# Patient Record
Sex: FEMALE | Race: WHITE | NOT HISPANIC OR LATINO | Employment: OTHER | URBAN - METROPOLITAN AREA
[De-identification: names, ages, dates, MRNs, and addresses within clinical notes are randomized per-mention and may not be internally consistent; named-entity substitution may affect disease eponyms.]

---

## 2017-01-03 PROCEDURE — 82043 UR ALBUMIN QUANTITATIVE: CPT | Performed by: INTERNAL MEDICINE

## 2017-01-03 PROCEDURE — 82570 ASSAY OF URINE CREATININE: CPT | Performed by: INTERNAL MEDICINE

## 2017-01-04 PROBLEM — I70.0 ATHEROSCLEROSIS OF AORTA (HCC): Status: ACTIVE | Noted: 2017-01-04

## 2017-01-04 PROBLEM — E66.01 MORBID OBESITY, UNSPECIFIED OBESITY TYPE (HCC): Status: ACTIVE | Noted: 2017-01-04

## 2017-01-11 PROBLEM — I10 HYPERTENSION, ESSENTIAL, BENIGN: Status: ACTIVE | Noted: 2017-01-11

## 2017-01-11 PROBLEM — E11.3393 UNCONTROLLED TYPE 2 DIABETES MELLITUS WITH BOTH EYES AFFECTED BY MODERATE NONPROLIFERATIVE RETINOPATHY WITHOUT MACULAR EDEMA, WITH LONG-TERM CURRENT USE OF INSULIN (HCC): Status: ACTIVE | Noted: 2017-01-11

## 2017-01-11 PROBLEM — Z79.4 UNCONTROLLED TYPE 2 DIABETES MELLITUS WITH BOTH EYES AFFECTED BY MODERATE NONPROLIFERATIVE RETINOPATHY WITHOUT MACULAR EDEMA, WITH LONG-TERM CURRENT USE OF INSULIN (HCC): Status: ACTIVE | Noted: 2017-01-11

## 2017-01-11 PROBLEM — IMO0002 UNCONTROLLED TYPE 2 DIABETES MELLITUS WITH BOTH EYES AFFECTED BY MODERATE NONPROLIFERATIVE RETINOPATHY WITHOUT MACULAR EDEMA, WITH LONG-TERM CURRENT USE OF INSULIN: Status: ACTIVE | Noted: 2017-01-11

## 2017-01-11 PROBLEM — E66.01 MORBID OBESITY, UNSPECIFIED OBESITY TYPE (HCC): Status: RESOLVED | Noted: 2017-01-04 | Resolved: 2017-01-11

## 2017-01-11 PROBLEM — E11.65 UNCONTROLLED TYPE 2 DIABETES MELLITUS WITH BOTH EYES AFFECTED BY MODERATE NONPROLIFERATIVE RETINOPATHY WITHOUT MACULAR EDEMA, WITH LONG-TERM CURRENT USE OF INSULIN (HCC): Status: ACTIVE | Noted: 2017-01-11

## 2017-03-23 PROBLEM — I25.10 ATHEROSCLEROSIS OF NATIVE CORONARY ARTERY OF NATIVE HEART WITHOUT ANGINA PECTORIS: Status: ACTIVE | Noted: 2017-03-23

## 2017-08-20 PROBLEM — E11.65 UNCONTROLLED TYPE 2 DIABETES MELLITUS WITH HYPERGLYCEMIA, WITH LONG-TERM CURRENT USE OF INSULIN (HCC): Status: ACTIVE | Noted: 2017-08-20

## 2017-08-20 PROBLEM — E78.5 DYSLIPIDEMIA: Status: ACTIVE | Noted: 2017-08-20

## 2017-08-20 PROBLEM — Z79.4 UNCONTROLLED TYPE 2 DIABETES MELLITUS WITH HYPERGLYCEMIA, WITH LONG-TERM CURRENT USE OF INSULIN (HCC): Status: ACTIVE | Noted: 2017-08-20

## 2017-11-08 PROBLEM — E78.5 DYSLIPIDEMIA: Status: RESOLVED | Noted: 2017-08-20 | Resolved: 2017-11-08

## 2017-12-06 PROBLEM — IMO0002 UNCONTROLLED TYPE 2 DIABETES MELLITUS WITH DIABETIC PERIPHERAL ANGIOPATHY WITHOUT GANGRENE, WITH LONG-TERM CURRENT USE OF INSULIN: Status: ACTIVE | Noted: 2017-12-06

## 2017-12-06 PROBLEM — Z79.4 UNCONTROLLED TYPE 2 DIABETES MELLITUS WITH DIABETIC PERIPHERAL ANGIOPATHY WITHOUT GANGRENE, WITH LONG-TERM CURRENT USE OF INSULIN (HCC): Status: ACTIVE | Noted: 2017-12-06

## 2017-12-06 PROBLEM — E11.51 UNCONTROLLED TYPE 2 DIABETES MELLITUS WITH DIABETIC PERIPHERAL ANGIOPATHY WITHOUT GANGRENE, WITH LONG-TERM CURRENT USE OF INSULIN (HCC): Status: ACTIVE | Noted: 2017-12-06

## 2017-12-06 PROBLEM — E11.65 UNCONTROLLED TYPE 2 DIABETES MELLITUS WITH DIABETIC PERIPHERAL ANGIOPATHY WITHOUT GANGRENE, WITH LONG-TERM CURRENT USE OF INSULIN (HCC): Status: ACTIVE | Noted: 2017-12-06

## 2018-04-04 PROBLEM — IMO0002 UNCONTROLLED TYPE 2 DIABETES MELLITUS WITH OTHER CIRCULATORY COMPLICATION, WITH LONG-TERM CURRENT USE OF INSULIN: Status: ACTIVE | Noted: 2018-04-04

## 2018-04-04 PROBLEM — E55.9 VITAMIN D DEFICIENCY: Status: ACTIVE | Noted: 2018-04-04

## 2018-04-04 PROBLEM — E11.65 UNCONTROLLED TYPE 2 DIABETES MELLITUS WITH OTHER CIRCULATORY COMPLICATION, WITH LONG-TERM CURRENT USE OF INSULIN: Status: ACTIVE | Noted: 2018-04-04

## 2018-04-04 PROBLEM — Z79.4 UNCONTROLLED TYPE 2 DIABETES MELLITUS WITH OTHER CIRCULATORY COMPLICATION, WITH LONG-TERM CURRENT USE OF INSULIN: Status: ACTIVE | Noted: 2018-04-04

## 2018-04-04 PROBLEM — E11.59 UNCONTROLLED TYPE 2 DIABETES MELLITUS WITH OTHER CIRCULATORY COMPLICATION, WITH LONG-TERM CURRENT USE OF INSULIN: Status: ACTIVE | Noted: 2018-04-04

## 2018-05-14 PROBLEM — M17.0 PRIMARY OSTEOARTHRITIS OF BOTH KNEES: Status: ACTIVE | Noted: 2018-05-14

## 2018-05-23 PROBLEM — R91.8 PULMONARY NODULES: Status: ACTIVE | Noted: 2018-05-23

## 2018-06-06 PROCEDURE — 82607 VITAMIN B-12: CPT | Performed by: NURSE PRACTITIONER

## 2018-06-06 PROCEDURE — 82397 CHEMILUMINESCENT ASSAY: CPT | Performed by: NURSE PRACTITIONER

## 2018-06-06 PROCEDURE — 82043 UR ALBUMIN QUANTITATIVE: CPT | Performed by: INTERNAL MEDICINE

## 2018-06-06 PROCEDURE — 82570 ASSAY OF URINE CREATININE: CPT | Performed by: INTERNAL MEDICINE

## 2018-06-14 PROCEDURE — 87077 CULTURE AEROBIC IDENTIFY: CPT | Performed by: INTERNAL MEDICINE

## 2018-06-14 PROCEDURE — 87205 SMEAR GRAM STAIN: CPT | Performed by: INTERNAL MEDICINE

## 2018-06-14 PROCEDURE — 87070 CULTURE OTHR SPECIMN AEROBIC: CPT | Performed by: INTERNAL MEDICINE

## 2018-07-30 PROBLEM — IMO0002 UNCONTROLLED TYPE 2 DIABETES MELLITUS WITH OTHER CIRCULATORY COMPLICATION, WITH LONG-TERM CURRENT USE OF INSULIN: Status: RESOLVED | Noted: 2018-04-04 | Resolved: 2018-07-30

## 2018-07-30 PROBLEM — IMO0002 UNCONTROLLED TYPE 2 DIABETES MELLITUS WITH DIABETIC PERIPHERAL ANGIOPATHY WITHOUT GANGRENE, WITH LONG-TERM CURRENT USE OF INSULIN: Status: RESOLVED | Noted: 2017-12-06 | Resolved: 2018-07-30

## 2018-07-30 PROBLEM — E11.51 UNCONTROLLED TYPE 2 DIABETES MELLITUS WITH DIABETIC PERIPHERAL ANGIOPATHY WITHOUT GANGRENE, WITH LONG-TERM CURRENT USE OF INSULIN (HCC): Status: RESOLVED | Noted: 2017-12-06 | Resolved: 2018-07-30

## 2018-07-30 PROBLEM — Z79.4 UNCONTROLLED TYPE 2 DIABETES MELLITUS WITH DIABETIC PERIPHERAL ANGIOPATHY WITHOUT GANGRENE, WITH LONG-TERM CURRENT USE OF INSULIN (HCC): Status: RESOLVED | Noted: 2017-12-06 | Resolved: 2018-07-30

## 2018-07-30 PROBLEM — E11.65 UNCONTROLLED TYPE 2 DIABETES MELLITUS WITH OTHER CIRCULATORY COMPLICATION, WITH LONG-TERM CURRENT USE OF INSULIN: Status: RESOLVED | Noted: 2018-04-04 | Resolved: 2018-07-30

## 2018-07-30 PROBLEM — Z79.4 UNCONTROLLED TYPE 2 DIABETES MELLITUS WITH OTHER CIRCULATORY COMPLICATION, WITH LONG-TERM CURRENT USE OF INSULIN: Status: RESOLVED | Noted: 2018-04-04 | Resolved: 2018-07-30

## 2018-07-30 PROBLEM — E11.59 UNCONTROLLED TYPE 2 DIABETES MELLITUS WITH OTHER CIRCULATORY COMPLICATION, WITH LONG-TERM CURRENT USE OF INSULIN: Status: RESOLVED | Noted: 2018-04-04 | Resolved: 2018-07-30

## 2018-07-30 PROBLEM — E11.65 UNCONTROLLED TYPE 2 DIABETES MELLITUS WITH DIABETIC PERIPHERAL ANGIOPATHY WITHOUT GANGRENE, WITH LONG-TERM CURRENT USE OF INSULIN (HCC): Status: RESOLVED | Noted: 2017-12-06 | Resolved: 2018-07-30

## 2018-08-08 PROCEDURE — 88305 TISSUE EXAM BY PATHOLOGIST: CPT | Performed by: OBSTETRICS & GYNECOLOGY

## 2018-12-31 PROBLEM — E78.1 HYPERTRIGLYCERIDEMIA: Status: ACTIVE | Noted: 2018-12-31

## 2018-12-31 PROBLEM — E11.59 UNCONTROLLED TYPE 2 DIABETES MELLITUS WITH CIRCULATORY DISORDER, WITH LONG-TERM CURRENT USE OF INSULIN (HCC): Status: ACTIVE | Noted: 2018-12-31

## 2018-12-31 PROBLEM — E11.65 UNCONTROLLED TYPE 2 DIABETES MELLITUS WITH CIRCULATORY DISORDER, WITH LONG-TERM CURRENT USE OF INSULIN (HCC): Status: ACTIVE | Noted: 2018-12-31

## 2018-12-31 PROBLEM — IMO0002 UNCONTROLLED TYPE 2 DIABETES MELLITUS WITH CIRCULATORY DISORDER, WITH LONG-TERM CURRENT USE OF INSULIN: Status: ACTIVE | Noted: 2018-12-31

## 2018-12-31 PROBLEM — Z79.4 UNCONTROLLED TYPE 2 DIABETES MELLITUS WITH CIRCULATORY DISORDER, WITH LONG-TERM CURRENT USE OF INSULIN (HCC): Status: ACTIVE | Noted: 2018-12-31

## 2019-10-02 PROBLEM — I65.23 BILATERAL CAROTID ARTERY STENOSIS: Status: ACTIVE | Noted: 2019-10-02

## 2019-10-14 PROBLEM — E78.1 HYPERTRIGLYCERIDEMIA: Status: RESOLVED | Noted: 2018-12-31 | Resolved: 2019-10-14

## 2019-10-23 PROBLEM — Z95.5 S/P RIGHT CORONARY ARTERY (RCA) STENT PLACEMENT: Status: ACTIVE | Noted: 2019-10-23

## 2019-10-23 PROBLEM — R93.1 AGATSTON CORONARY ARTERY CALCIUM SCORE GREATER THAN 400: Status: ACTIVE | Noted: 2019-10-23

## 2019-10-23 PROBLEM — Z78.9 STATIN INTOLERANCE: Status: ACTIVE | Noted: 2019-10-23

## 2020-05-04 PROBLEM — I51.5 CARDIAC CALCIFICATION (HCC): Status: ACTIVE | Noted: 2020-05-04

## 2020-10-19 PROBLEM — E11.65 UNCONTROLLED TYPE 2 DIABETES MELLITUS WITH DIABETIC PERIPHERAL ANGIOPATHY WITHOUT GANGRENE, WITH LONG-TERM CURRENT USE OF INSULIN (HCC): Status: RESOLVED | Noted: 2017-12-06 | Resolved: 2020-10-19

## 2020-10-19 PROBLEM — Z79.4 CONTROLLED TYPE 2 DIABETES MELLITUS WITH DIABETIC PERIPHERAL ANGIOPATHY WITHOUT GANGRENE, WITH LONG-TERM CURRENT USE OF INSULIN (HCC): Status: ACTIVE | Noted: 2020-10-19

## 2020-10-19 PROBLEM — Z79.4 UNCONTROLLED TYPE 2 DIABETES MELLITUS WITH DIABETIC PERIPHERAL ANGIOPATHY WITHOUT GANGRENE, WITH LONG-TERM CURRENT USE OF INSULIN (HCC): Status: RESOLVED | Noted: 2017-12-06 | Resolved: 2020-10-19

## 2020-10-19 PROBLEM — Z79.4 UNCONTROLLED TYPE 2 DIABETES MELLITUS WITH CIRCULATORY DISORDER, WITH LONG-TERM CURRENT USE OF INSULIN (HCC): Status: RESOLVED | Noted: 2018-12-31 | Resolved: 2020-10-19

## 2020-10-19 PROBLEM — E11.59 CONTROLLED TYPE 2 DIABETES MELLITUS WITH OTHER CIRCULATORY COMPLICATION, WITH LONG-TERM CURRENT USE OF INSULIN (HCC): Status: ACTIVE | Noted: 2020-10-19

## 2020-10-19 PROBLEM — Z79.4 UNCONTROLLED TYPE 2 DIABETES MELLITUS WITH HYPERGLYCEMIA, WITH LONG-TERM CURRENT USE OF INSULIN (HCC): Status: RESOLVED | Noted: 2017-08-20 | Resolved: 2020-10-19

## 2020-10-19 PROBLEM — Z79.4 CONTROLLED TYPE 2 DIABETES MELLITUS WITH OTHER CIRCULATORY COMPLICATION, WITH LONG-TERM CURRENT USE OF INSULIN (HCC): Status: ACTIVE | Noted: 2020-10-19

## 2020-10-19 PROBLEM — E11.59 UNCONTROLLED TYPE 2 DIABETES MELLITUS WITH CIRCULATORY DISORDER, WITH LONG-TERM CURRENT USE OF INSULIN (HCC): Status: RESOLVED | Noted: 2018-12-31 | Resolved: 2020-10-19

## 2020-10-19 PROBLEM — IMO0002 UNCONTROLLED TYPE 2 DIABETES MELLITUS WITH BOTH EYES AFFECTED BY MODERATE NONPROLIFERATIVE RETINOPATHY WITHOUT MACULAR EDEMA, WITH LONG-TERM CURRENT USE OF INSULIN: Status: RESOLVED | Noted: 2017-01-11 | Resolved: 2020-10-19

## 2020-10-19 PROBLEM — IMO0002 UNCONTROLLED TYPE 2 DIABETES MELLITUS WITH DIABETIC PERIPHERAL ANGIOPATHY WITHOUT GANGRENE, WITH LONG-TERM CURRENT USE OF INSULIN: Status: RESOLVED | Noted: 2017-12-06 | Resolved: 2020-10-19

## 2020-10-19 PROBLEM — E11.65 UNCONTROLLED TYPE 2 DIABETES MELLITUS WITH HYPERGLYCEMIA, WITH LONG-TERM CURRENT USE OF INSULIN (HCC): Status: RESOLVED | Noted: 2017-08-20 | Resolved: 2020-10-19

## 2020-10-19 PROBLEM — E11.51 CONTROLLED TYPE 2 DIABETES MELLITUS WITH DIABETIC PERIPHERAL ANGIOPATHY WITHOUT GANGRENE, WITH LONG-TERM CURRENT USE OF INSULIN (HCC): Status: ACTIVE | Noted: 2020-10-19

## 2020-10-19 PROBLEM — Z79.4 CONTROLLED TYPE 2 DIABETES MELLITUS WITH BOTH EYES AFFECTED BY MODERATE NONPROLIFERATIVE RETINOPATHY WITHOUT MACULAR EDEMA, WITH LONG-TERM CURRENT USE OF INSULIN (HCC): Status: ACTIVE | Noted: 2020-10-19

## 2020-10-19 PROBLEM — IMO0002 UNCONTROLLED TYPE 2 DIABETES MELLITUS WITH CIRCULATORY DISORDER, WITH LONG-TERM CURRENT USE OF INSULIN: Status: RESOLVED | Noted: 2018-12-31 | Resolved: 2020-10-19

## 2020-10-19 PROBLEM — E11.3393 UNCONTROLLED TYPE 2 DIABETES MELLITUS WITH BOTH EYES AFFECTED BY MODERATE NONPROLIFERATIVE RETINOPATHY WITHOUT MACULAR EDEMA, WITH LONG-TERM CURRENT USE OF INSULIN (HCC): Status: RESOLVED | Noted: 2017-01-11 | Resolved: 2020-10-19

## 2020-10-19 PROBLEM — E11.65 UNCONTROLLED TYPE 2 DIABETES MELLITUS WITH CIRCULATORY DISORDER, WITH LONG-TERM CURRENT USE OF INSULIN (HCC): Status: RESOLVED | Noted: 2018-12-31 | Resolved: 2020-10-19

## 2020-10-19 PROBLEM — E11.3393 CONTROLLED TYPE 2 DIABETES MELLITUS WITH BOTH EYES AFFECTED BY MODERATE NONPROLIFERATIVE RETINOPATHY WITHOUT MACULAR EDEMA, WITH LONG-TERM CURRENT USE OF INSULIN (HCC): Status: ACTIVE | Noted: 2020-10-19

## 2020-10-19 PROBLEM — E11.65 UNCONTROLLED TYPE 2 DIABETES MELLITUS WITH BOTH EYES AFFECTED BY MODERATE NONPROLIFERATIVE RETINOPATHY WITHOUT MACULAR EDEMA, WITH LONG-TERM CURRENT USE OF INSULIN (HCC): Status: RESOLVED | Noted: 2017-01-11 | Resolved: 2020-10-19

## 2020-10-19 PROBLEM — Z79.4 UNCONTROLLED TYPE 2 DIABETES MELLITUS WITH BOTH EYES AFFECTED BY MODERATE NONPROLIFERATIVE RETINOPATHY WITHOUT MACULAR EDEMA, WITH LONG-TERM CURRENT USE OF INSULIN (HCC): Status: RESOLVED | Noted: 2017-01-11 | Resolved: 2020-10-19

## 2020-10-19 PROBLEM — E11.51 UNCONTROLLED TYPE 2 DIABETES MELLITUS WITH DIABETIC PERIPHERAL ANGIOPATHY WITHOUT GANGRENE, WITH LONG-TERM CURRENT USE OF INSULIN (HCC): Status: RESOLVED | Noted: 2017-12-06 | Resolved: 2020-10-19

## 2020-11-01 ENCOUNTER — APPOINTMENT (OUTPATIENT)
Dept: CT IMAGING | Age: 70
End: 2020-11-01

## 2020-11-01 ENCOUNTER — HOSPITAL ENCOUNTER (EMERGENCY)
Age: 70
Discharge: HOME | End: 2020-11-01
Attending: EMERGENCY MEDICINE

## 2020-11-01 VITALS
DIASTOLIC BLOOD PRESSURE: 54 MMHG | HEART RATE: 70 BPM | TEMPERATURE: 97.7 F | WEIGHT: 245.37 LBS | BODY MASS INDEX: 41.89 KG/M2 | HEIGHT: 64 IN | SYSTOLIC BLOOD PRESSURE: 128 MMHG | RESPIRATION RATE: 20 BRPM | OXYGEN SATURATION: 97 %

## 2020-11-01 DIAGNOSIS — R51.9 ACUTE NONINTRACTABLE HEADACHE, UNSPECIFIED HEADACHE TYPE: Primary | ICD-10-CM

## 2020-11-01 LAB
ANION GAP SERPL CALC-SCNC: 13 MMOL/L (ref 10–20)
BASOPHILS # BLD: 0 K/MCL (ref 0–0.3)
BASOPHILS NFR BLD: 1 %
BUN SERPL-MCNC: 14 MG/DL (ref 6–20)
BUN/CREAT SERPL: 20 (ref 7–25)
CALCIUM SERPL-MCNC: 8.6 MG/DL (ref 8.4–10.2)
CHLORIDE SERPL-SCNC: 109 MMOL/L (ref 98–107)
CO2 SERPL-SCNC: 22 MMOL/L (ref 21–32)
CREAT SERPL-MCNC: 0.7 MG/DL (ref 0.51–0.95)
CRP SERPL-MCNC: <0.3 MG/DL
DIFFERENTIAL METHOD BLD: NORMAL
EOSINOPHIL # BLD: 0.1 K/MCL (ref 0.1–0.5)
EOSINOPHIL NFR BLD: 2 %
ERYTHROCYTE [DISTWIDTH] IN BLOOD: 14.6 % (ref 11–15)
ERYTHROCYTE [SEDIMENTATION RATE] IN BLOOD BY WESTERGREN METHOD: 31 MM/HR (ref 0–20)
GLUCOSE SERPL-MCNC: 122 MG/DL (ref 65–99)
HCT VFR BLD CALC: 41.2 % (ref 36–46.5)
HGB BLD-MCNC: 13.2 G/DL (ref 12–15.5)
IMM GRANULOCYTES # BLD AUTO: 0 K/MCL (ref 0–0.2)
IMM GRANULOCYTES NFR BLD: 0 %
LYMPHOCYTES # BLD: 1.2 K/MCL (ref 1–4)
LYMPHOCYTES NFR BLD: 15 %
MCH RBC QN AUTO: 27.8 PG (ref 26–34)
MCHC RBC AUTO-ENTMCNC: 32 G/DL (ref 32–36.5)
MCV RBC AUTO: 86.7 FL (ref 78–100)
MONOCYTES # BLD: 0.8 K/MCL (ref 0.3–0.9)
MONOCYTES NFR BLD: 10 %
NEUTROPHILS # BLD: 5.8 K/MCL (ref 1.8–7.7)
NEUTROPHILS NFR BLD: 72 %
NRBC BLD MANUAL-RTO: 0 /100 WBC
PLATELET # BLD: 246 K/MCL (ref 140–450)
POTASSIUM SERPL-SCNC: 3.8 MMOL/L (ref 3.4–5.1)
RBC # BLD: 4.75 MIL/MCL (ref 4–5.2)
SODIUM SERPL-SCNC: 140 MMOL/L (ref 135–145)
WBC # BLD: 8.1 K/MCL (ref 4.2–11)

## 2020-11-01 PROCEDURE — C9803 HOPD COVID-19 SPEC COLLECT: HCPCS

## 2020-11-01 PROCEDURE — 99284 EMERGENCY DEPT VISIT MOD MDM: CPT

## 2020-11-01 PROCEDURE — 70498 CT ANGIOGRAPHY NECK: CPT

## 2020-11-01 PROCEDURE — 85025 COMPLETE CBC W/AUTO DIFF WBC: CPT

## 2020-11-01 PROCEDURE — 70496 CT ANGIOGRAPHY HEAD: CPT

## 2020-11-01 PROCEDURE — 36415 COLL VENOUS BLD VENIPUNCTURE: CPT

## 2020-11-01 PROCEDURE — 85652 RBC SED RATE AUTOMATED: CPT

## 2020-11-01 PROCEDURE — 86140 C-REACTIVE PROTEIN: CPT

## 2020-11-01 PROCEDURE — U0003 INFECTIOUS AGENT DETECTION BY NUCLEIC ACID (DNA OR RNA); SEVERE ACUTE RESPIRATORY SYNDROME CORONAVIRUS 2 (SARS-COV-2) (CORONAVIRUS DISEASE [COVID-19]), AMPLIFIED PROBE TECHNIQUE, MAKING USE OF HIGH THROUGHPUT TECHNOLOGIES AS DESCRIBED BY CMS-2020-01-R: HCPCS

## 2020-11-01 PROCEDURE — 10002805 HB CONTRAST AGENT: Performed by: PHYSICIAN ASSISTANT

## 2020-11-01 PROCEDURE — 80048 BASIC METABOLIC PNL TOTAL CA: CPT

## 2020-11-01 RX ORDER — LOSARTAN POTASSIUM 50 MG/1
50 TABLET ORAL DAILY
COMMUNITY

## 2020-11-01 RX ORDER — FUROSEMIDE 20 MG/1
20 TABLET ORAL
COMMUNITY

## 2020-11-01 RX ADMIN — IOHEXOL 100 ML: 350 INJECTION, SOLUTION INTRAVENOUS at 20:30

## 2020-11-01 ASSESSMENT — ENCOUNTER SYMPTOMS
COUGH: 0
PHOTOPHOBIA: 0
VOMITING: 0
WEAKNESS: 0
LIGHT-HEADEDNESS: 0
SPEECH DIFFICULTY: 0
DIZZINESS: 0
FEVER: 0
NUMBNESS: 0
CHILLS: 0
HEADACHES: 1
NAUSEA: 0
SHORTNESS OF BREATH: 0

## 2020-11-01 ASSESSMENT — PAIN SCALES - GENERAL: PAINLEVEL_OUTOF10: 3

## 2020-11-01 ASSESSMENT — PAIN DESCRIPTION - PAIN TYPE: TYPE: ACUTE PAIN

## 2020-11-02 LAB
SARS-COV-2 RNA RESP QL NAA+PROBE: NOT DETECTED
SERVICE CMNT-IMP: NORMAL
SPECIMEN SOURCE: NORMAL

## 2021-05-18 PROBLEM — E78.1 HYPERTRIGLYCERIDEMIA: Status: RESOLVED | Noted: 2018-12-31 | Resolved: 2021-05-18

## 2021-05-18 PROBLEM — E11.59 CONTROLLED TYPE 2 DIABETES MELLITUS WITH OTHER CIRCULATORY COMPLICATION, WITH LONG-TERM CURRENT USE OF INSULIN (HCC): Status: RESOLVED | Noted: 2020-10-19 | Resolved: 2021-05-18

## 2021-05-18 PROBLEM — Z79.4 CONTROLLED TYPE 2 DIABETES MELLITUS WITH OTHER CIRCULATORY COMPLICATION, WITH LONG-TERM CURRENT USE OF INSULIN (HCC): Status: RESOLVED | Noted: 2020-10-19 | Resolved: 2021-05-18

## 2022-03-09 PROBLEM — E66.9 OBESITY (BMI 30-39.9): Status: ACTIVE | Noted: 2022-03-09

## 2022-10-05 ENCOUNTER — APPOINTMENT (OUTPATIENT)
Dept: GENERAL RADIOLOGY | Age: 72
End: 2022-10-05
Attending: EMERGENCY MEDICINE
Payer: MEDICARE

## 2022-10-05 ENCOUNTER — HOSPITAL ENCOUNTER (OUTPATIENT)
Age: 72
Discharge: HOME | End: 2022-10-05
Attending: EMERGENCY MEDICINE
Payer: MEDICARE

## 2022-10-05 VITALS
OXYGEN SATURATION: 99 % | DIASTOLIC BLOOD PRESSURE: 55 MMHG | HEART RATE: 75 BPM | TEMPERATURE: 97 F | SYSTOLIC BLOOD PRESSURE: 156 MMHG | RESPIRATION RATE: 21 BRPM

## 2022-10-05 DIAGNOSIS — S52.135A CLOSED NONDISPLACED FRACTURE OF NECK OF LEFT RADIUS, INITIAL ENCOUNTER: Primary | ICD-10-CM

## 2022-10-05 PROCEDURE — 73130 X-RAY EXAM OF HAND: CPT | Performed by: EMERGENCY MEDICINE

## 2022-10-05 PROCEDURE — 73080 X-RAY EXAM OF ELBOW: CPT | Performed by: EMERGENCY MEDICINE

## 2022-10-05 PROCEDURE — 99204 OFFICE O/P NEW MOD 45 MIN: CPT

## 2022-10-05 PROCEDURE — 73030 X-RAY EXAM OF SHOULDER: CPT | Performed by: EMERGENCY MEDICINE

## 2022-10-05 PROCEDURE — 73090 X-RAY EXAM OF FOREARM: CPT | Performed by: EMERGENCY MEDICINE

## 2022-10-05 PROCEDURE — 29505 APPLICATION LONG LEG SPLINT: CPT

## 2022-10-05 NOTE — ED INITIAL ASSESSMENT (HPI)
Per pt fell after loosing balance while jerking hose in garden, reports left arm pain with limited rom to shoulder. Pt also reports left hip bruising.

## 2023-08-22 ENCOUNTER — LAB ENCOUNTER (OUTPATIENT)
Dept: LAB | Age: 73
End: 2023-08-22
Attending: HOSPITALIST
Payer: MEDICARE

## 2023-08-22 DIAGNOSIS — Z01.818 PREOP TESTING: ICD-10-CM

## 2023-08-22 LAB
ANTIBODY SCREEN: NEGATIVE
RH BLOOD TYPE: POSITIVE
RH BLOOD TYPE: POSITIVE

## 2023-08-22 PROCEDURE — 86850 RBC ANTIBODY SCREEN: CPT

## 2023-08-22 PROCEDURE — 87641 MR-STAPH DNA AMP PROBE: CPT

## 2023-08-22 PROCEDURE — 86901 BLOOD TYPING SEROLOGIC RH(D): CPT

## 2023-08-22 PROCEDURE — 36415 COLL VENOUS BLD VENIPUNCTURE: CPT

## 2023-08-22 PROCEDURE — 86900 BLOOD TYPING SEROLOGIC ABO: CPT

## 2023-08-22 RX ORDER — TIRZEPATIDE 7.5 MG/.5ML
7.5 INJECTION, SOLUTION SUBCUTANEOUS WEEKLY
COMMUNITY
Start: 2023-05-18

## 2023-08-23 LAB — MRSA DNA SPEC QL NAA+PROBE: NEGATIVE

## 2023-08-31 ENCOUNTER — APPOINTMENT (OUTPATIENT)
Dept: GENERAL RADIOLOGY | Facility: HOSPITAL | Age: 73
End: 2023-08-31
Attending: PHYSICIAN ASSISTANT
Payer: MEDICARE

## 2023-08-31 ENCOUNTER — HOSPITAL ENCOUNTER (OUTPATIENT)
Facility: HOSPITAL | Age: 73
Setting detail: HOSPITAL OUTPATIENT SURGERY
Discharge: HOME HEALTH | End: 2023-08-31
Attending: ORTHOPAEDIC SURGERY | Admitting: ORTHOPAEDIC SURGERY
Payer: MEDICARE

## 2023-08-31 ENCOUNTER — ANESTHESIA EVENT (OUTPATIENT)
Dept: SURGERY | Facility: HOSPITAL | Age: 73
End: 2023-08-31
Payer: MEDICARE

## 2023-08-31 ENCOUNTER — ANESTHESIA (OUTPATIENT)
Dept: SURGERY | Facility: HOSPITAL | Age: 73
End: 2023-08-31
Payer: MEDICARE

## 2023-08-31 VITALS
DIASTOLIC BLOOD PRESSURE: 56 MMHG | HEART RATE: 87 BPM | OXYGEN SATURATION: 99 % | BODY MASS INDEX: 38.24 KG/M2 | SYSTOLIC BLOOD PRESSURE: 148 MMHG | WEIGHT: 224 LBS | RESPIRATION RATE: 18 BRPM | HEIGHT: 64 IN | TEMPERATURE: 97 F

## 2023-08-31 DIAGNOSIS — Z01.818 PREOP TESTING: Primary | ICD-10-CM

## 2023-08-31 LAB
GLUCOSE BLDC GLUCOMTR-MCNC: 161 MG/DL (ref 70–99)
GLUCOSE BLDC GLUCOMTR-MCNC: 203 MG/DL (ref 70–99)

## 2023-08-31 PROCEDURE — 97161 PT EVAL LOW COMPLEX 20 MIN: CPT

## 2023-08-31 PROCEDURE — 97530 THERAPEUTIC ACTIVITIES: CPT

## 2023-08-31 PROCEDURE — 97116 GAIT TRAINING THERAPY: CPT

## 2023-08-31 PROCEDURE — 8E0Y0CZ ROBOTIC ASSISTED PROCEDURE OF LOWER EXTREMITY, OPEN APPROACH: ICD-10-PCS | Performed by: ORTHOPAEDIC SURGERY

## 2023-08-31 PROCEDURE — 88305 TISSUE EXAM BY PATHOLOGIST: CPT | Performed by: ORTHOPAEDIC SURGERY

## 2023-08-31 PROCEDURE — 0SRD0J9 REPLACEMENT OF LEFT KNEE JOINT WITH SYNTHETIC SUBSTITUTE, CEMENTED, OPEN APPROACH: ICD-10-PCS | Performed by: ORTHOPAEDIC SURGERY

## 2023-08-31 PROCEDURE — 73560 X-RAY EXAM OF KNEE 1 OR 2: CPT | Performed by: PHYSICIAN ASSISTANT

## 2023-08-31 PROCEDURE — 97165 OT EVAL LOW COMPLEX 30 MIN: CPT

## 2023-08-31 PROCEDURE — 82962 GLUCOSE BLOOD TEST: CPT

## 2023-08-31 PROCEDURE — 88311 DECALCIFY TISSUE: CPT | Performed by: ORTHOPAEDIC SURGERY

## 2023-08-31 PROCEDURE — 64447 NJX AA&/STRD FEMORAL NRV IMG: CPT | Performed by: ORTHOPAEDIC SURGERY

## 2023-08-31 PROCEDURE — 97535 SELF CARE MNGMENT TRAINING: CPT

## 2023-08-31 DEVICE — IMPLANTABLE DEVICE
Type: IMPLANTABLE DEVICE | Site: KNEE | Status: FUNCTIONAL
Brand: REFOBACIN® BONE CEMENT R

## 2023-08-31 DEVICE — NEX ALL-POLY PAT 35MM: Type: IMPLANTABLE DEVICE | Site: KNEE | Status: FUNCTIONAL

## 2023-08-31 DEVICE — IMPLANTABLE DEVICE
Type: IMPLANTABLE DEVICE | Site: KNEE | Status: FUNCTIONAL
Brand: PERSONA® VIVACIT-E®

## 2023-08-31 DEVICE — IMPLANTABLE DEVICE
Type: IMPLANTABLE DEVICE | Site: KNEE | Status: FUNCTIONAL
Brand: PERSONA® NATURAL TIBIA®

## 2023-08-31 DEVICE — IMPLANTABLE DEVICE
Type: IMPLANTABLE DEVICE | Site: KNEE | Status: FUNCTIONAL
Brand: PERSONA®

## 2023-08-31 RX ORDER — HYDROMORPHONE HYDROCHLORIDE 1 MG/ML
0.2 INJECTION, SOLUTION INTRAMUSCULAR; INTRAVENOUS; SUBCUTANEOUS EVERY 5 MIN PRN
Status: DISCONTINUED | OUTPATIENT
Start: 2023-08-31 | End: 2023-08-31

## 2023-08-31 RX ORDER — NICOTINE POLACRILEX 4 MG
15 LOZENGE BUCCAL
Status: DISCONTINUED | OUTPATIENT
Start: 2023-08-31 | End: 2023-08-31

## 2023-08-31 RX ORDER — DOCUSATE SODIUM 100 MG/1
100 CAPSULE, LIQUID FILLED ORAL 2 TIMES DAILY
OUTPATIENT
Start: 2023-08-31

## 2023-08-31 RX ORDER — SENNOSIDES 8.6 MG
17.2 TABLET ORAL NIGHTLY
OUTPATIENT
Start: 2023-08-31

## 2023-08-31 RX ORDER — FAMOTIDINE 20 MG/1
20 TABLET, FILM COATED ORAL ONCE
Status: COMPLETED | OUTPATIENT
Start: 2023-08-31 | End: 2023-08-31

## 2023-08-31 RX ORDER — DEXAMETHASONE SODIUM PHOSPHATE 10 MG/ML
INJECTION, SOLUTION INTRAMUSCULAR; INTRAVENOUS
Status: COMPLETED | OUTPATIENT
Start: 2023-08-31 | End: 2023-08-31

## 2023-08-31 RX ORDER — HYDROCODONE BITARTRATE AND ACETAMINOPHEN 10; 325 MG/1; MG/1
1 TABLET ORAL EVERY 6 HOURS PRN
Status: DISCONTINUED | OUTPATIENT
Start: 2023-08-31 | End: 2023-08-31

## 2023-08-31 RX ORDER — MORPHINE SULFATE 10 MG/ML
6 INJECTION, SOLUTION INTRAMUSCULAR; INTRAVENOUS EVERY 10 MIN PRN
Status: DISCONTINUED | OUTPATIENT
Start: 2023-08-31 | End: 2023-08-31

## 2023-08-31 RX ORDER — LIDOCAINE HYDROCHLORIDE 10 MG/ML
INJECTION, SOLUTION INFILTRATION; PERINEURAL
Status: COMPLETED | OUTPATIENT
Start: 2023-08-31 | End: 2023-08-31

## 2023-08-31 RX ORDER — HYDROMORPHONE HYDROCHLORIDE 1 MG/ML
0.4 INJECTION, SOLUTION INTRAMUSCULAR; INTRAVENOUS; SUBCUTANEOUS EVERY 5 MIN PRN
Status: DISCONTINUED | OUTPATIENT
Start: 2023-08-31 | End: 2023-08-31

## 2023-08-31 RX ORDER — NICOTINE POLACRILEX 4 MG
15 LOZENGE BUCCAL
Status: DISCONTINUED | OUTPATIENT
Start: 2023-08-31 | End: 2023-08-31 | Stop reason: HOSPADM

## 2023-08-31 RX ORDER — BUPIVACAINE HYDROCHLORIDE 7.5 MG/ML
INJECTION, SOLUTION INTRASPINAL
Status: COMPLETED | OUTPATIENT
Start: 2023-08-31 | End: 2023-08-31

## 2023-08-31 RX ORDER — DIPHENHYDRAMINE HYDROCHLORIDE 50 MG/ML
12.5 INJECTION INTRAMUSCULAR; INTRAVENOUS EVERY 4 HOURS PRN
OUTPATIENT
Start: 2023-08-31

## 2023-08-31 RX ORDER — METOCLOPRAMIDE 10 MG/1
10 TABLET ORAL ONCE
Status: COMPLETED | OUTPATIENT
Start: 2023-08-31 | End: 2023-08-31

## 2023-08-31 RX ORDER — NALOXONE HYDROCHLORIDE 0.4 MG/ML
0.08 INJECTION, SOLUTION INTRAMUSCULAR; INTRAVENOUS; SUBCUTANEOUS AS NEEDED
Status: DISCONTINUED | OUTPATIENT
Start: 2023-08-31 | End: 2023-08-31

## 2023-08-31 RX ORDER — DEXTROSE MONOHYDRATE 25 G/50ML
50 INJECTION, SOLUTION INTRAVENOUS
Status: DISCONTINUED | OUTPATIENT
Start: 2023-08-31 | End: 2023-08-31 | Stop reason: HOSPADM

## 2023-08-31 RX ORDER — MORPHINE SULFATE 4 MG/ML
2 INJECTION, SOLUTION INTRAMUSCULAR; INTRAVENOUS EVERY 10 MIN PRN
Status: DISCONTINUED | OUTPATIENT
Start: 2023-08-31 | End: 2023-08-31

## 2023-08-31 RX ORDER — TRANEXAMIC ACID 650 MG/1
1300 TABLET ORAL ONCE
Status: COMPLETED | OUTPATIENT
Start: 2023-08-31 | End: 2023-08-31

## 2023-08-31 RX ORDER — METOCLOPRAMIDE HYDROCHLORIDE 5 MG/ML
10 INJECTION INTRAMUSCULAR; INTRAVENOUS EVERY 8 HOURS PRN
OUTPATIENT
Start: 2023-08-31

## 2023-08-31 RX ORDER — POLYETHYLENE GLYCOL 3350 17 G/17G
17 POWDER, FOR SOLUTION ORAL DAILY PRN
OUTPATIENT
Start: 2023-08-31

## 2023-08-31 RX ORDER — SODIUM CHLORIDE, SODIUM LACTATE, POTASSIUM CHLORIDE, CALCIUM CHLORIDE 600; 310; 30; 20 MG/100ML; MG/100ML; MG/100ML; MG/100ML
INJECTION, SOLUTION INTRAVENOUS CONTINUOUS
Status: DISCONTINUED | OUTPATIENT
Start: 2023-08-31 | End: 2023-08-31

## 2023-08-31 RX ORDER — MORPHINE SULFATE 4 MG/ML
4 INJECTION, SOLUTION INTRAMUSCULAR; INTRAVENOUS EVERY 10 MIN PRN
Status: DISCONTINUED | OUTPATIENT
Start: 2023-08-31 | End: 2023-08-31

## 2023-08-31 RX ORDER — DEXTROSE MONOHYDRATE 25 G/50ML
50 INJECTION, SOLUTION INTRAVENOUS
Status: DISCONTINUED | OUTPATIENT
Start: 2023-08-31 | End: 2023-08-31

## 2023-08-31 RX ORDER — ONDANSETRON 2 MG/ML
INJECTION INTRAMUSCULAR; INTRAVENOUS AS NEEDED
Status: DISCONTINUED | OUTPATIENT
Start: 2023-08-31 | End: 2023-08-31 | Stop reason: SURG

## 2023-08-31 RX ORDER — ACETAMINOPHEN 500 MG
1000 TABLET ORAL ONCE
Status: COMPLETED | OUTPATIENT
Start: 2023-08-31 | End: 2023-08-31

## 2023-08-31 RX ORDER — ENEMA 19; 7 G/133ML; G/133ML
1 ENEMA RECTAL ONCE AS NEEDED
OUTPATIENT
Start: 2023-08-31

## 2023-08-31 RX ORDER — VANCOMYCIN HYDROCHLORIDE
15 ONCE
OUTPATIENT
Start: 2023-08-31

## 2023-08-31 RX ORDER — NICOTINE POLACRILEX 4 MG
30 LOZENGE BUCCAL
Status: DISCONTINUED | OUTPATIENT
Start: 2023-08-31 | End: 2023-08-31

## 2023-08-31 RX ORDER — NICOTINE POLACRILEX 4 MG
30 LOZENGE BUCCAL
Status: DISCONTINUED | OUTPATIENT
Start: 2023-08-31 | End: 2023-08-31 | Stop reason: HOSPADM

## 2023-08-31 RX ORDER — DEXAMETHASONE SODIUM PHOSPHATE 4 MG/ML
VIAL (ML) INJECTION AS NEEDED
Status: DISCONTINUED | OUTPATIENT
Start: 2023-08-31 | End: 2023-08-31 | Stop reason: SURG

## 2023-08-31 RX ORDER — ROPIVACAINE HYDROCHLORIDE 5 MG/ML
INJECTION, SOLUTION EPIDURAL; INFILTRATION; PERINEURAL
Status: COMPLETED | OUTPATIENT
Start: 2023-08-31 | End: 2023-08-31

## 2023-08-31 RX ORDER — LIDOCAINE HYDROCHLORIDE 10 MG/ML
INJECTION, SOLUTION EPIDURAL; INFILTRATION; INTRACAUDAL; PERINEURAL AS NEEDED
Status: DISCONTINUED | OUTPATIENT
Start: 2023-08-31 | End: 2023-08-31 | Stop reason: SURG

## 2023-08-31 RX ORDER — CEFAZOLIN SODIUM/WATER 2 G/20 ML
2 SYRINGE (ML) INTRAVENOUS ONCE
Status: DISCONTINUED | OUTPATIENT
Start: 2023-08-31 | End: 2023-08-31 | Stop reason: HOSPADM

## 2023-08-31 RX ORDER — HYDROMORPHONE HYDROCHLORIDE 1 MG/ML
0.6 INJECTION, SOLUTION INTRAMUSCULAR; INTRAVENOUS; SUBCUTANEOUS EVERY 5 MIN PRN
Status: DISCONTINUED | OUTPATIENT
Start: 2023-08-31 | End: 2023-08-31

## 2023-08-31 RX ORDER — DIPHENHYDRAMINE HCL 25 MG
25 CAPSULE ORAL EVERY 4 HOURS PRN
OUTPATIENT
Start: 2023-08-31

## 2023-08-31 RX ORDER — ONDANSETRON 2 MG/ML
4 INJECTION INTRAMUSCULAR; INTRAVENOUS EVERY 6 HOURS PRN
Status: DISCONTINUED | OUTPATIENT
Start: 2023-08-31 | End: 2023-08-31

## 2023-08-31 RX ORDER — BISACODYL 10 MG
10 SUPPOSITORY, RECTAL RECTAL
OUTPATIENT
Start: 2023-08-31

## 2023-08-31 RX ORDER — ASPIRIN 81 MG/1
81 TABLET ORAL 2 TIMES DAILY
OUTPATIENT
Start: 2023-08-31 | End: 2023-10-12

## 2023-08-31 RX ORDER — DIPHENHYDRAMINE HYDROCHLORIDE 50 MG/ML
25 INJECTION INTRAMUSCULAR; INTRAVENOUS ONCE AS NEEDED
OUTPATIENT
Start: 2023-08-31 | End: 2023-08-31

## 2023-08-31 RX ADMIN — BUPIVACAINE HYDROCHLORIDE 1.6 ML: 7.5 INJECTION, SOLUTION INTRASPINAL at 09:46:00

## 2023-08-31 RX ADMIN — DEXAMETHASONE SODIUM PHOSPHATE 4 MG: 4 MG/ML VIAL (ML) INJECTION at 09:55:00

## 2023-08-31 RX ADMIN — ONDANSETRON 4 MG: 2 INJECTION INTRAMUSCULAR; INTRAVENOUS at 09:55:00

## 2023-08-31 RX ADMIN — LIDOCAINE HYDROCHLORIDE 5 ML: 10 INJECTION, SOLUTION INFILTRATION; PERINEURAL at 09:30:00

## 2023-08-31 RX ADMIN — LIDOCAINE HYDROCHLORIDE 5 ML: 10 INJECTION, SOLUTION INFILTRATION; PERINEURAL at 09:46:00

## 2023-08-31 RX ADMIN — LIDOCAINE HYDROCHLORIDE 50 MG: 10 INJECTION, SOLUTION EPIDURAL; INFILTRATION; INTRACAUDAL; PERINEURAL at 09:55:00

## 2023-08-31 RX ADMIN — DEXAMETHASONE SODIUM PHOSPHATE 10 MG: 10 INJECTION, SOLUTION INTRAMUSCULAR; INTRAVENOUS at 09:30:00

## 2023-08-31 RX ADMIN — ROPIVACAINE HYDROCHLORIDE 30 ML: 5 INJECTION, SOLUTION EPIDURAL; INFILTRATION; PERINEURAL at 09:30:00

## 2023-08-31 NOTE — CM/SW NOTE
08/31/23 1400   Discharge disposition   Expected discharge disposition [address] [facility] Provider [facility]   Discharge transportation Private car     Plan is for home today 8/31. [facility] has been reserved and is aware of discharge plan for today.      [phone]

## 2023-08-31 NOTE — OPERATIVE REPORT
PATIENT'S NAME: Aline Fox    ATTENDING PHYSICIAN: Cam Sanches MD   OPERATING PHYSICIAN: Cam Sanches MD   PATIENT ACCOUNT#: [id number]   MEDICAL RECORD #: [id number]   YOB: 1950          ADMISSION DATE: 8/31/2023           OPERATION DATE: 8/31/2023       OPERATIVE REPORT        PREOPERATIVE DIAGNOSIS: Left knee osteoarthritis. POSTOPERATIVE DIAGNOSIS:  Left knee osteoarthritis. PROCEDURE:  Robotic assisted left total knee arthroplasty. COMPLICATIONS:  None. ANESTHESIA:  Spinal plus adductor block. SPECIMENS REMOVED:  Bony components sent to Pathology. IMPLANTS USED:  Elizabeth Persona femoral component size 9 left narrow CR; tibial component, size D; all poly patella, size  35; articular insert, size 12. TOURNIQUET TIME:  approx 54 minutes. BLOOD LOSS:  Approximately 100 mL. ASSISTANTS:  Carmel Younger PA-C, and Ruby Montano. INDICATIONS:  The patient is a pleasant 70-year-old woman who has failed nonoperative treatment of left knee osteoarthritis. We discussed risks and benefits of operative intervention going forward with the surgery to include infection, stiffness, neurovascular injury, anesthetic risk, continued pain, possible need for further surgery, DVT, PE. He understood these risks and desired to proceed. OPERATIVE TECHNIQUE:  After adequate induction of spinal anesthesia and adductor block, left lower extremity was prepped and draped in the usual sterile fashion after application of well-padded tourniquet to the left upper thigh. Prior to the case, the leg was exsanguinated, tourniquet taken up to 250 mmHg. At this point, a standard midline incision was made, followed by a medial parapatellar arthrotomy. Patella was everted and knee was flexed. Fat pad was removed. ACL was removed. . At this time, tracking pins were placed.   After appropriate measurement, robotic arm was used to place distal cutting pins, drill 4-in-1 holes, and place proximal tibial pins. Distal cut was made. A 4-in-1 cutting block was placed. Anterior, posterior, and chamfer cuts were then made. PCL retractor was used to gain access to the proximal tibia, and menisci were then removed at this time. Proximal tibia cut was made. Flexion-extension gaps were found to be stable at approximately 12 mm. At this time, a tibial tray was placed in appropriate rotation and pinned. Femoral component was placed and pinned. Trial polyethylene was placed. The patella was rongeured free of osteophytes, reamed down to appropriate size. A 35 mm patellar button was then medialized once peg holes were drilled. Knee was taken through range of motion and the patella was found to track well. At this time, the femur was prepared with a drill. Tibia was prepared with a drill and punch. All trials were removed. Bony surfaces were irrigated while cement was mixed and allowed to fully harden. Cement was first placed about the tibia, followed by the femur, then the patella. Components were then placed in this order, followed by a trial insert. Excess cement was removed with a scraper. Once the cement hardened, the trial polyethylene was removed. The final polyethylene was placed after appropriate irrigation. Knee was irrigated thoroughly one additional time. The arthrotomy was closed with #1 Ethibond, subcutaneous tissue with 2-0 Vicryl, skin with staples. It should be noted that prior to closure pain cocktail was used for local anesthetic and TXA was used for hemostasis. Sterile dressings were applied. The patient tolerated the procedure well. She was taken to PACU in stable condition. Please note that prior to the case a time-out was completed, correct site was identified, and preoperative antibiotics were given. Please also note that assistants were required for the case to help with different sewing and drilling techniques. Cam Wilcox MD

## 2023-08-31 NOTE — CM/SW NOTE
Department  notified of request for [facility], sari referrals started. Assigned CM/SW to follow up with pt/family on further discharge planning.      Albert Poon   August 31, 2023   09:59

## 2023-08-31 NOTE — CM/SW NOTE
CM received secure chat from [facility] liaison, pt has been referred to [facility] prior to admission by Dr. Chris Quijano. [facility] unable to accept pt at this time. Pt has been re-referred to [facility]. F2F entered. Kirby Marie.  Patito Patel RN, BSN  Nurse   [phone]

## 2023-08-31 NOTE — H&P
History & Physical Examination    Patient Name: Patricia Blum  MRN: [id number]  [facility]: [id number]  YOB: 1950    Diagnosis: LEFT KNEE OA    Present Illness: FAILED CONSERVATIVE MEASURES    Acetaminophen (TYLENOL ARTHRITIS PAIN OR), Take by mouth as needed. , Disp: , Rfl: , 8/27/2023  Tirzepatide (MOUNJARO) 7.5 MG/0.5ML Subcutaneous Solution Pen-injector, Inject 7.5 mg into the skin once a week., Disp: , Rfl: , 8/19/2023  metoprolol succinate ER 50 MG Oral Tablet 24 Hr, Take 1 tablet (50 mg total) by mouth daily. , Disp: 90 tablet, Rfl: 3, 8/31/2023  losartan 50 MG Oral Tab, Take 1 tablet (50 mg total) by mouth daily. , Disp: 90 tablet, Rfl: 3, 8/30/2023  HUMALOG KWIKPEN 100 UNIT/ML Subcutaneous Solution Pen-injector, INJECT A MAXIMUM OF 50 UNITS UNDER THE SKIN EVERY DAY AS DIRECTED, Disp: 45 mL, Rfl: 0, 8/30/2023  METFORMIN HCL  MG Oral Tablet 24 Hr, TAKE 4 TABLETS BY MOUTH AT BEDTIME, Disp: 360 tablet, Rfl: 1, 8/29/2023  LANTUS SOLOSTAR 100 UNIT/ML Subcutaneous Solution Pen-injector, INJECT 25 UNITS UNDER THE SKIN WITH BREAKFAST AND 30 UNITS AT BEDTIME, Disp: 63 mL, Rfl: 1, 8/30/2023  Levothyroxine Sodium 50 MCG Oral Tab, Take 1 tablet (50 mcg total) by mouth every morning before breakfast., Disp: 90 tablet, Rfl: 3, 8/31/2023  topiramate (TOPAMAX) 25 MG Oral Tab, Take 1 tablet (25 mg total) by mouth 3 (three) times daily. , Disp: 270 tablet, Rfl: 3  Cholecalciferol (VITAMIN D) 2000 units Oral Tab, Take 1 tablet daily, Disp: , Rfl: , 8/22/2023  aspirin 81 MG Oral Tab EC, Take 1 tablet (81 mg total) by mouth daily. , Disp: , Rfl: , 8/23/2023  CALTRATE 600+D OR, Take 1 tablet daily, Disp: , Rfl: , 8/22/2023  MULTIVITAMIN OR, Take 1 tablet daily, Disp: , Rfl: , 8/22/2023  MUPIROCIN 2 % External Ointment, APPLY EXTERNALLY TO THE AFFECTED AREA TWICE DAILY FOR 7 DAYS AS NEEDED, Disp: 22 g, Rfl: 0, Unknown  Azelastine HCl 0.1 % Nasal Solution, 2 sprays by Nasal route 2 (two) times daily.  (Patient taking differently: 2 sprays by Nasal route as needed.), Disp: 1 each, Rfl: 5, Unknown  guaiFENesin-codeine (CHERATUSSIN AC) 100-10 MG/5ML Oral Solution, Take 5 mL by mouth 3 (three) times daily as needed for cough. , Disp: 236 mL, Rfl: 0  rivaroxaban (XARELTO) 2.5 MG Oral Tab, Take 1 tablet (2.5 mg total) by mouth 2 (two) times daily with meals. , Disp: 180 tablet, Rfl: 3, 8/28/2023  DEXCOM G6 TRANSMITTER Does not apply Misc, 1 each by Does not apply route every 3 (three) months., Disp: 1 each, Rfl: 3  DEXCOM G6 SENSOR Does not apply Misc, 1 each by Does not apply route Every 10 days. , Disp: 9 each, Rfl: 3  Evolocumab (REPATHA SURECLICK) 486 MG/ML Subcutaneous Solution Auto-injector, Inject 1 pen as directed every 14 (fourteen) days. , Disp: 6 pen, Rfl: 3, 8/18/2023  CONTOUR NEXT TEST In Vitro Strip, TEST BLOOD SUGAR FOUR TIMES DAILY, Disp: 400 strip, Rfl: 1  TECHLITE PEN NEEDLES 31G X 8 MM Does not apply Misc, USE WITH INSULIN PENS FIVE TIMES DAILY AS DIRECTED, Disp: 500 each, Rfl: 3  ACCU-CHEK FASTCLIX LANCETS Does not apply Misc, Check blood sugars 3 times per day, Disp: 300 each, Rfl: 1  Blood Glucose Calibration (ACCU-CHEK SMARTVIEW CONTROL) In Vitro Liquid, Use with each new vial of strips, Disp: 1 Each, Rfl: 2      glucose (Dex4) 15 GM/59ML oral liquid 15 g, 15 g, Oral, Q15 Min PRN   Or  glucose (Glutose) 40% oral gel 15 g, 15 g, Oral, Q15 Min PRN   Or  glucose-vitamin C (Dex-4) chewable tab 4 tablet, 4 tablet, Oral, Q15 Min PRN   Or  dextrose 50% injection 50 mL, 50 mL, Intravenous, Q15 Min PRN   Or  glucose (Dex4) 15 GM/59ML oral liquid 30 g, 30 g, Oral, Q15 Min PRN   Or  glucose (Glutose) 40% oral gel 30 g, 30 g, Oral, Q15 Min PRN   Or  glucose-vitamin C (Dex-4) chewable tab 8 tablet, 8 tablet, Oral, Q15 Min PRN  lactated ringers infusion, , Intravenous, Continuous  metoprolol tartrate (Lopressor) tab 25 mg, 25 mg, Oral, Once PRN  ceFAZolin (Ancef) 2 g in 20mL IV syringe premix, 2 g, Intravenous, Once  bupivacaine PF (Marcaine) 0.5 % 20 mL, morphINE (PF) 4 mg, ketorolac (Toradol) 30 MG/ML 30 mg in sodium chloride 0.9% pain cocktail syringe (OR), , Intra-articular, Once (Intra-Op)        Allergies:   Augmentin [Amoxicil*    DIARRHEA, NAUSEA AND VOMITING    Comment:Other reaction(s): Gastritis  Ace Inhibitors          Coughing    Past Medical History:   Diagnosis Date    ANXIETY     BP (high blood pressure)     Cardiac calcification ([address] 2019 cardiac calcium score    high score of 4503    Carotid artery stenosis 2019 US,    chronic occlusion of the right; 1-15% of the left    CORONARY ARTERY DISEASE     cath about 5-6 years ago showed 50% blockage with silent MI, now in 2010 has abnl stress test    Diabetic neuropathy ([address]     Heart attack ([address] 2013    History of cardiovascular stress test 2019    normal    Hypothyroidism Dx in 2011    Mixed hyperlipidemia     Morbid obesity with BMI of 45.0-49.9, adult ([address]     Nonproliferative diabetic retinopathy ([address]     Nontoxic multinodular goiter Dx in 2012    Dx in 2012 - Thyroid U/S showed a right lower pole nodule of 1.1 cm with peripheral calcification.  Biopsy benign colloid nodule in 2012     Obesity, unspecified     Other and unspecified hyperlipidemia     Pneumonia due to organism     Pulmonary nodule 2015 CT abdomen/pelvis    noted incidentally on CT scan    Screening for osteoporosis DEXA 2014    normal    Stroke ([address]     mini-stroke no residual effects    Type 2 diabetes mellitus ([address]     Dx at age 40     Past Surgical History:   Procedure Laterality Date    CATARACT      CATH DRUG ELUTING STENT       DELIVERY ONLY       x2     COLONOSCOPY  2015    repeat in 10 yrs per Dr. Francisca Garland       2004    Tato Barrientos      angiogram 2003    D & C  2016    benign pathology (done by Dr. Rocío Pelaez for [facility])    OTHER SURGICAL HISTORY  age 52    pilonidal cyst    REMOVAL OF TONSILS,12+ Y/O  age 10 Family History   Problem Relation Age of Onset    Lung Disorder Father     Diabetes Father         Type 2 DM    Other (Other) Father     Cancer Mother         throat    Heart Disorder Mother         CAD, hx of rheumatic fever    Diabetes Mother         Type 2 DM    Hypertension Mother     Allergies Son     Diabetes Maternal Grandmother         Type 2 DM    Other (Other) Sister         Osteoporosis'    Cancer Brother     Thyroid Disorder Neg      Social History    Tobacco Use      Smoking status: Former        Packs/day: 0.00        Types: Cigarettes        Quit date: 2003        Years since quittin.8      Smokeless tobacco: Never      Tobacco comments: Social smoker    Alcohol use: Yes      Alcohol/week: 0.0 - 2.5 standard drinks of alcohol      Comment: Social      SYSTEM Check if Review is Normal Check if Physical Exam is Normal If not normal, please explain:   HEENT [ x] [ x]    [place] [ x] [ x]    HEART [ x] [ x]    LUNGS [ x] [ x]    ABDOMEN [ x] [ x]    UROGENITAL [ x] [ x]    EXTREMITIES [ ] [ ] L +varus, +effusion   OTHER        [ x ] I have discussed the risks and benefits and alternatives with the patient/family. They understand and agree to proceed with plan of care. [ x ] I have reviewed the History and Physical done within the last 30 days. Any changes noted above.     PLAN: LEFT TKA    Faye Haro MD  2023  9:39 AM

## 2023-08-31 NOTE — PHYSICAL THERAPY NOTE
PHYSICAL THERAPY EVALUATION - INPATIENT     [facility]  Evaluation Date: 8/31/2023  Type of Evaluation: Initial   Physician Order: PT Eval and Treat    Presenting Problem: primary osteoarthritis of left knee, s/p L TKA     Reason for Therapy: Mobility Dysfunction and Discharge Planning    PHYSICAL THERAPY ASSESSMENT     Patient is a 67year old female admitted 8/31/2023 for primary osteoarthritis of left knee, s/p L TKA. Patient is WBAT LLE. Patient's current functional deficits include impaired L knee ROM and strength, impaired bed mobility, transfers, ambulation and stair negotiation, which are below the patient's pre-admission status. Patient received supine in bed; agreeable to participate in therapy on this date. RN approved activity. Patient educated on POC. Education on physiological benefits of mobility post operatively. Discussed TKA protocol, weight bearing status, precautions and education on therapeutic exercises- patient provided with handout to summarize. Patient denying pain. Supportive family present throughout session and involved with education. Bed Mobility: CGA supine<>EOB, increased time required to complete task. Patient tolerated sitting EOB approx 10 minutes requiring supervision to maintain midline neutral position. Transfers: CGA progressing to Supervision- cues for appropriate UE positioning with transitional movements. Instruction on activity pacing upon standing to allow body time to acclimate to change in position. CGA>Supervision to maintain static standing with bilateral UE support on RW approx 1 minute prior to mobilization. Patient tolerated marching in place with BUE support and CGA; no knee buckling or LOB noted. Ambulation: CGA progressing to Supervision as distance increased for 300 feet with use of RW. Decreased L stance time, decreased helder speed, shuffled steps, step to progressing to step through gait pattern.  Cues for safe management of RW with turns. Patient in bed at end of session. Needs in reach and family present. RN aware. The patient's Approx Degree of Impairment: 46.58% has been calculated based on documentation in the [facility] '6 clicks' Inpatient Basic Mobility Short Form. Research supports that patients with this level of impairment may benefit from home-health PT with initial 24 hour supervision/care in order to optimize functional independence. Equipment Recommendation for Discharge: Rolling Walker    DISCHARGE RECOMMENDATIONS  PT Discharge Recommendations: Home with home health PT;24 hour care/supervision    PLAN    Patient has been evaluated and presents with no skilled Physical Therapy needs at this time. Patient will be discharged from Physical Therapy services. Please re-order if a new functional limitation presents during this admission. PHYSICAL THERAPY MEDICAL/SOCIAL HISTORY     Problem List  Active Problems:    * No active hospital problems. *      HOME SITUATION  Home Situation  Type of Home: House  Home Layout: Two level; Able to live on main level  Stairs to Enter : 4  Railing: Yes  Stairs to Bedroom: 14  Railing: Yes  Lives With: Alone (family will assist at d/c)  Drives: Yes  Patient Owned Equipment: Cane (standard walker)  Patient Regularly Uses: None     Prior Level of [place]: Independent with ADLs/iADLs/functional mobility, driving    SUBJECTIVE  \"All I want is a double jamo on the rocks but I can't drink\"    PHYSICAL THERAPY EXAMINATION     OBJECTIVE  Precautions: Limb alert - left  Fall Risk: Standard fall risk    WEIGHT BEARING RESTRICTION  L Lower Extremity: Weight Bearing as Tolerated    PAIN ASSESSMENT  Ratin    COGNITION  Overall Cognitive Status:  WFL - within functional limits    RANGE OF MOTION AND STRENGTH ASSESSMENT  Upper extremity ROM and strength are within functional limits   Lower extremity ROM is within functional limits; L knee limited approx 0-90 degrees   Lower extremity strength is within functional limits; LLE not formally assessed due to recent surgery     BALANCE  Static Sitting: Good  Dynamic Sitting: Fair +  Static Standing: Fair  Dynamic Standing: Fair -    ACTIVITY TOLERANCE  Pulse: 87  Heart Rate Source: Monitor     BP: 148/56  BP Location: Right arm  BP Method: Automatic  Patient Position: Lying    O2 WALK  Oxygen Therapy  SPO2% on Room Air at Rest: 95    AM-PAC '6-Clicks' INPATIENT SHORT FORM - BASIC MOBILITY  How much difficulty does the patient currently have. .. Patient Difficulty: Turning over in bed (including adjusting bedclothes, sheets and blankets)?: A Little   Patient Difficulty: Sitting down on and standing up from a chair with arms (e.g., wheelchair, bedside commode, etc.): A Little   Patient Difficulty: Moving from lying on back to sitting on the side of the bed?: A Little   How much help from another person does the patient currently need. .. Help from Another: Moving to and from a bed to a chair (including a wheelchair)?: A Little   Help from Another: Need to walk in hospital room?: A Little   Help from Another: Climbing 3-5 steps with a railing?: A Little     AM-PAC Score:  Raw Score: 18   Approx Degree of Impairment: 46.58%   Standardized Score (AM-PAC Scale): 43.63   CMS Modifier (G-Code): CK    FUNCTIONAL ABILITY STATUS  Functional Mobility/Gait Assessment  Gait Assistance: Contact guard assist;Supervision  Distance (ft): 300  Assistive Device: Rolling walker  Pattern: L Decreased stance time; Shuffle (decreased helder speed)  Stairs: Stairs  How Many Stairs: 4  Device: 2 Rails  Assist: Contact guard assist  Pattern: Ascend and Descend  Ascend and Descend : Step to    Exercise/Education Provided:  Bed mobility  Body mechanics  Energy conservation  Functional activity tolerated  Gait training  Posture  Transfer training  Stair negotiation     Patient End of Session: In bed;Needs met;Call light within reach;RN aware of session/findings; All patient questions and concerns addressed; Family present    Patient was able to achieve the following . ..    Patient able to transfer   Supervision, safe for home    Patient able to ambulate on level surfaces   Supervision with RW, safe for home     Patient Evaluation Complexity Level:  History Low - no personal factors and/or co-morbidities   Examination of body systems Low - addressing 1-2 elements   Clinical Presentation Low - Stable   Clinical Decision Making Low Complexity       Gait Trainin minutes  Therapeutic Activity: 15 minutes

## 2024-04-11 ENCOUNTER — LAB ENCOUNTER (OUTPATIENT)
Dept: LAB | Age: 74
End: 2024-04-11
Attending: ORTHOPAEDIC SURGERY
Payer: MEDICARE

## 2024-04-11 DIAGNOSIS — Z01.818 PREOP TESTING: ICD-10-CM

## 2024-04-11 PROCEDURE — 87641 MR-STAPH DNA AMP PROBE: CPT

## 2024-04-12 LAB — MRSA DNA SPEC QL NAA+PROBE: NEGATIVE

## 2024-04-15 NOTE — CM/SW NOTE
SW was notified the pt. Has been preoperatively arranged with [facility].   Referral, orders and face to face to be sent via Aidin, post op.      PETERSON Keene ext [phone]

## 2024-04-17 ENCOUNTER — ANESTHESIA EVENT (OUTPATIENT)
Dept: SURGERY | Facility: HOSPITAL | Age: 74
End: 2024-04-17
Payer: MEDICARE

## 2024-04-18 ENCOUNTER — ANESTHESIA (OUTPATIENT)
Dept: SURGERY | Facility: HOSPITAL | Age: 74
End: 2024-04-18
Payer: MEDICARE

## 2024-04-18 ENCOUNTER — HOSPITAL ENCOUNTER (OUTPATIENT)
Facility: HOSPITAL | Age: 74
Discharge: HOME HEALTH | End: 2024-04-18
Attending: ORTHOPAEDIC SURGERY | Admitting: ORTHOPAEDIC SURGERY
Payer: MEDICARE

## 2024-04-18 ENCOUNTER — APPOINTMENT (OUTPATIENT)
Dept: GENERAL RADIOLOGY | Facility: HOSPITAL | Age: 74
End: 2024-04-18
Attending: ORTHOPAEDIC SURGERY
Payer: MEDICARE

## 2024-04-18 VITALS
HEART RATE: 60 BPM | RESPIRATION RATE: 18 BRPM | SYSTOLIC BLOOD PRESSURE: 156 MMHG | TEMPERATURE: 98 F | WEIGHT: 217 LBS | BODY MASS INDEX: 37.05 KG/M2 | HEIGHT: 64 IN | OXYGEN SATURATION: 99 % | DIASTOLIC BLOOD PRESSURE: 69 MMHG

## 2024-04-18 DIAGNOSIS — Z01.818 PREOP TESTING: Primary | ICD-10-CM

## 2024-04-18 LAB
GLUCOSE BLDC GLUCOMTR-MCNC: 148 MG/DL (ref 70–99)
GLUCOSE BLDC GLUCOMTR-MCNC: 168 MG/DL (ref 70–99)

## 2024-04-18 PROCEDURE — 97535 SELF CARE MNGMENT TRAINING: CPT

## 2024-04-18 PROCEDURE — 82962 GLUCOSE BLOOD TEST: CPT

## 2024-04-18 PROCEDURE — 88305 TISSUE EXAM BY PATHOLOGIST: CPT | Performed by: ORTHOPAEDIC SURGERY

## 2024-04-18 PROCEDURE — 97161 PT EVAL LOW COMPLEX 20 MIN: CPT

## 2024-04-18 PROCEDURE — 8E0Y0CZ ROBOTIC ASSISTED PROCEDURE OF LOWER EXTREMITY, OPEN APPROACH: ICD-10-PCS | Performed by: ORTHOPAEDIC SURGERY

## 2024-04-18 PROCEDURE — 97165 OT EVAL LOW COMPLEX 30 MIN: CPT

## 2024-04-18 PROCEDURE — 0SRC069 REPLACEMENT OF RIGHT KNEE JOINT WITH OXIDIZED ZIRCONIUM ON POLYETHYLENE SYNTHETIC SUBSTITUTE, CEMENTED, OPEN APPROACH: ICD-10-PCS | Performed by: ORTHOPAEDIC SURGERY

## 2024-04-18 PROCEDURE — 73560 X-RAY EXAM OF KNEE 1 OR 2: CPT | Performed by: ORTHOPAEDIC SURGERY

## 2024-04-18 PROCEDURE — 88311 DECALCIFY TISSUE: CPT | Performed by: ORTHOPAEDIC SURGERY

## 2024-04-18 PROCEDURE — 64415 NJX AA&/STRD BRCH PLXS IMG: CPT | Performed by: ORTHOPAEDIC SURGERY

## 2024-04-18 PROCEDURE — 97530 THERAPEUTIC ACTIVITIES: CPT

## 2024-04-18 DEVICE — IMPLANTABLE DEVICE
Type: IMPLANTABLE DEVICE | Site: KNEE | Status: FUNCTIONAL
Brand: BIOMET® BONE CEMENT R

## 2024-04-18 DEVICE — IMPLANTABLE DEVICE
Type: IMPLANTABLE DEVICE | Site: KNEE | Status: FUNCTIONAL
Brand: PERSONA®

## 2024-04-18 DEVICE — IMPLANTABLE DEVICE
Type: IMPLANTABLE DEVICE | Site: KNEE | Status: FUNCTIONAL
Brand: PERSONA® VIVACIT-E®

## 2024-04-18 DEVICE — IMPLANTABLE DEVICE
Type: IMPLANTABLE DEVICE | Site: KNEE | Status: FUNCTIONAL
Brand: PERSONA® NATURAL TIBIA®

## 2024-04-18 RX ORDER — DOCUSATE SODIUM 100 MG/1
100 CAPSULE, LIQUID FILLED ORAL 2 TIMES DAILY
OUTPATIENT
Start: 2024-04-18

## 2024-04-18 RX ORDER — ONDANSETRON 2 MG/ML
INJECTION INTRAMUSCULAR; INTRAVENOUS AS NEEDED
Status: DISCONTINUED | OUTPATIENT
Start: 2024-04-18 | End: 2024-04-18 | Stop reason: SURG

## 2024-04-18 RX ORDER — ONDANSETRON 2 MG/ML
4 INJECTION INTRAMUSCULAR; INTRAVENOUS EVERY 6 HOURS PRN
OUTPATIENT
Start: 2024-04-18

## 2024-04-18 RX ORDER — MORPHINE SULFATE 4 MG/ML
4 INJECTION, SOLUTION INTRAMUSCULAR; INTRAVENOUS EVERY 10 MIN PRN
Status: DISCONTINUED | OUTPATIENT
Start: 2024-04-18 | End: 2024-04-18

## 2024-04-18 RX ORDER — ACETAMINOPHEN 500 MG
1000 TABLET ORAL ONCE
Status: COMPLETED | OUTPATIENT
Start: 2024-04-18 | End: 2024-04-18

## 2024-04-18 RX ORDER — HYDROMORPHONE HYDROCHLORIDE 1 MG/ML
0.6 INJECTION, SOLUTION INTRAMUSCULAR; INTRAVENOUS; SUBCUTANEOUS EVERY 5 MIN PRN
Status: DISCONTINUED | OUTPATIENT
Start: 2024-04-18 | End: 2024-04-18

## 2024-04-18 RX ORDER — FAMOTIDINE 20 MG/1
20 TABLET, FILM COATED ORAL ONCE
Status: COMPLETED | OUTPATIENT
Start: 2024-04-18 | End: 2024-04-18

## 2024-04-18 RX ORDER — ROPIVACAINE HYDROCHLORIDE 5 MG/ML
INJECTION, SOLUTION EPIDURAL; INFILTRATION; PERINEURAL
Status: COMPLETED | OUTPATIENT
Start: 2024-04-18 | End: 2024-04-18

## 2024-04-18 RX ORDER — METOCLOPRAMIDE HYDROCHLORIDE 5 MG/ML
10 INJECTION INTRAMUSCULAR; INTRAVENOUS ONCE
Status: COMPLETED | OUTPATIENT
Start: 2024-04-18 | End: 2024-04-18

## 2024-04-18 RX ORDER — LIDOCAINE HYDROCHLORIDE 10 MG/ML
INJECTION, SOLUTION INFILTRATION; PERINEURAL
Status: COMPLETED | OUTPATIENT
Start: 2024-04-18 | End: 2024-04-18

## 2024-04-18 RX ORDER — LIDOCAINE HYDROCHLORIDE 10 MG/ML
INJECTION, SOLUTION EPIDURAL; INFILTRATION; INTRACAUDAL; PERINEURAL AS NEEDED
Status: DISCONTINUED | OUTPATIENT
Start: 2024-04-18 | End: 2024-04-18 | Stop reason: SURG

## 2024-04-18 RX ORDER — SODIUM CHLORIDE, SODIUM LACTATE, POTASSIUM CHLORIDE, CALCIUM CHLORIDE 600; 310; 30; 20 MG/100ML; MG/100ML; MG/100ML; MG/100ML
INJECTION, SOLUTION INTRAVENOUS CONTINUOUS
Status: DISCONTINUED | OUTPATIENT
Start: 2024-04-18 | End: 2024-04-18

## 2024-04-18 RX ORDER — DEXTROSE MONOHYDRATE 25 G/50ML
50 INJECTION, SOLUTION INTRAVENOUS
Status: DISCONTINUED | OUTPATIENT
Start: 2024-04-18 | End: 2024-04-18

## 2024-04-18 RX ORDER — DEXAMETHASONE SODIUM PHOSPHATE 4 MG/ML
VIAL (ML) INJECTION AS NEEDED
Status: DISCONTINUED | OUTPATIENT
Start: 2024-04-18 | End: 2024-04-18 | Stop reason: SURG

## 2024-04-18 RX ORDER — DIPHENHYDRAMINE HCL 25 MG
25 CAPSULE ORAL EVERY 4 HOURS PRN
OUTPATIENT
Start: 2024-04-18

## 2024-04-18 RX ORDER — MORPHINE SULFATE 15 MG/1
15 TABLET ORAL EVERY 4 HOURS PRN
OUTPATIENT
Start: 2024-04-18 | End: 2024-04-19

## 2024-04-18 RX ORDER — CEFAZOLIN SODIUM/WATER 2 G/20 ML
2 SYRINGE (ML) INTRAVENOUS ONCE
Status: DISCONTINUED | OUTPATIENT
Start: 2024-04-18 | End: 2024-04-18 | Stop reason: HOSPADM

## 2024-04-18 RX ORDER — HYDROMORPHONE HYDROCHLORIDE 1 MG/ML
0.4 INJECTION, SOLUTION INTRAMUSCULAR; INTRAVENOUS; SUBCUTANEOUS EVERY 5 MIN PRN
Status: DISCONTINUED | OUTPATIENT
Start: 2024-04-18 | End: 2024-04-18

## 2024-04-18 RX ORDER — ENEMA 19; 7 G/133ML; G/133ML
1 ENEMA RECTAL ONCE AS NEEDED
OUTPATIENT
Start: 2024-04-18

## 2024-04-18 RX ORDER — FAMOTIDINE 10 MG/ML
20 INJECTION, SOLUTION INTRAVENOUS ONCE
Status: COMPLETED | OUTPATIENT
Start: 2024-04-18 | End: 2024-04-18

## 2024-04-18 RX ORDER — HYDROCODONE BITARTRATE AND ACETAMINOPHEN 10; 325 MG/1; MG/1
1 TABLET ORAL EVERY 4 HOURS PRN
Status: DISCONTINUED | OUTPATIENT
Start: 2024-04-18 | End: 2024-04-18

## 2024-04-18 RX ORDER — HYDROMORPHONE HYDROCHLORIDE 1 MG/ML
0.2 INJECTION, SOLUTION INTRAMUSCULAR; INTRAVENOUS; SUBCUTANEOUS EVERY 5 MIN PRN
Status: DISCONTINUED | OUTPATIENT
Start: 2024-04-18 | End: 2024-04-18

## 2024-04-18 RX ORDER — MORPHINE SULFATE 10 MG/ML
6 INJECTION, SOLUTION INTRAMUSCULAR; INTRAVENOUS EVERY 10 MIN PRN
Status: DISCONTINUED | OUTPATIENT
Start: 2024-04-18 | End: 2024-04-18

## 2024-04-18 RX ORDER — MORPHINE SULFATE 4 MG/ML
2 INJECTION, SOLUTION INTRAMUSCULAR; INTRAVENOUS EVERY 2 HOUR PRN
OUTPATIENT
Start: 2024-04-18 | End: 2024-04-19

## 2024-04-18 RX ORDER — DIPHENHYDRAMINE HYDROCHLORIDE 50 MG/ML
25 INJECTION INTRAMUSCULAR; INTRAVENOUS ONCE AS NEEDED
OUTPATIENT
Start: 2024-04-18 | End: 2024-04-18

## 2024-04-18 RX ORDER — CEFAZOLIN SODIUM/WATER 2 G/20 ML
SYRINGE (ML) INTRAVENOUS AS NEEDED
Status: DISCONTINUED | OUTPATIENT
Start: 2024-04-18 | End: 2024-04-18 | Stop reason: SURG

## 2024-04-18 RX ORDER — OXYCODONE HYDROCHLORIDE 5 MG/1
10 TABLET ORAL EVERY 4 HOURS PRN
OUTPATIENT
Start: 2024-04-18 | End: 2024-04-19

## 2024-04-18 RX ORDER — ACETAMINOPHEN 500 MG
1000 TABLET ORAL EVERY 8 HOURS
OUTPATIENT
Start: 2024-04-18 | End: 2024-04-19

## 2024-04-18 RX ORDER — NICOTINE POLACRILEX 4 MG
15 LOZENGE BUCCAL
Status: DISCONTINUED | OUTPATIENT
Start: 2024-04-18 | End: 2024-04-18

## 2024-04-18 RX ORDER — TRANEXAMIC ACID 650 MG/1
1300 TABLET ORAL ONCE
Status: COMPLETED | OUTPATIENT
Start: 2024-04-18 | End: 2024-04-18

## 2024-04-18 RX ORDER — POLYETHYLENE GLYCOL 3350 17 G/17G
17 POWDER, FOR SOLUTION ORAL DAILY PRN
OUTPATIENT
Start: 2024-04-18

## 2024-04-18 RX ORDER — CEFAZOLIN SODIUM/WATER 2 G/20 ML
2 SYRINGE (ML) INTRAVENOUS EVERY 8 HOURS
OUTPATIENT
Start: 2024-04-18 | End: 2024-04-19

## 2024-04-18 RX ORDER — MORPHINE SULFATE 4 MG/ML
4 INJECTION, SOLUTION INTRAMUSCULAR; INTRAVENOUS EVERY 2 HOUR PRN
OUTPATIENT
Start: 2024-04-18 | End: 2024-04-19

## 2024-04-18 RX ORDER — DIPHENHYDRAMINE HYDROCHLORIDE 50 MG/ML
12.5 INJECTION INTRAMUSCULAR; INTRAVENOUS EVERY 4 HOURS PRN
OUTPATIENT
Start: 2024-04-18

## 2024-04-18 RX ORDER — BISACODYL 10 MG
10 SUPPOSITORY, RECTAL RECTAL
OUTPATIENT
Start: 2024-04-18

## 2024-04-18 RX ORDER — MORPHINE SULFATE 4 MG/ML
2 INJECTION, SOLUTION INTRAMUSCULAR; INTRAVENOUS EVERY 10 MIN PRN
Status: DISCONTINUED | OUTPATIENT
Start: 2024-04-18 | End: 2024-04-18

## 2024-04-18 RX ORDER — NICOTINE POLACRILEX 4 MG
30 LOZENGE BUCCAL
Status: DISCONTINUED | OUTPATIENT
Start: 2024-04-18 | End: 2024-04-18

## 2024-04-18 RX ORDER — MORPHINE SULFATE 4 MG/ML
6 INJECTION, SOLUTION INTRAMUSCULAR; INTRAVENOUS EVERY 2 HOUR PRN
OUTPATIENT
Start: 2024-04-18 | End: 2024-04-19

## 2024-04-18 RX ORDER — NALOXONE HYDROCHLORIDE 0.4 MG/ML
0.08 INJECTION, SOLUTION INTRAMUSCULAR; INTRAVENOUS; SUBCUTANEOUS AS NEEDED
Status: DISCONTINUED | OUTPATIENT
Start: 2024-04-18 | End: 2024-04-18

## 2024-04-18 RX ORDER — OXYCODONE HYDROCHLORIDE 5 MG/1
5 TABLET ORAL EVERY 4 HOURS PRN
OUTPATIENT
Start: 2024-04-18 | End: 2024-04-19

## 2024-04-18 RX ORDER — SENNOSIDES 8.6 MG
17.2 TABLET ORAL NIGHTLY
OUTPATIENT
Start: 2024-04-18

## 2024-04-18 RX ORDER — ROCURONIUM BROMIDE 10 MG/ML
INJECTION, SOLUTION INTRAVENOUS AS NEEDED
Status: DISCONTINUED | OUTPATIENT
Start: 2024-04-18 | End: 2024-04-18 | Stop reason: SURG

## 2024-04-18 RX ORDER — METOCLOPRAMIDE HYDROCHLORIDE 5 MG/ML
10 INJECTION INTRAMUSCULAR; INTRAVENOUS EVERY 8 HOURS PRN
OUTPATIENT
Start: 2024-04-18

## 2024-04-18 RX ORDER — DEXAMETHASONE SODIUM PHOSPHATE 10 MG/ML
INJECTION, SOLUTION INTRAMUSCULAR; INTRAVENOUS
Status: COMPLETED | OUTPATIENT
Start: 2024-04-18 | End: 2024-04-18

## 2024-04-18 RX ORDER — GLYCOPYRROLATE 0.2 MG/ML
INJECTION, SOLUTION INTRAMUSCULAR; INTRAVENOUS AS NEEDED
Status: DISCONTINUED | OUTPATIENT
Start: 2024-04-18 | End: 2024-04-18 | Stop reason: SURG

## 2024-04-18 RX ORDER — MIDAZOLAM HYDROCHLORIDE 1 MG/ML
INJECTION INTRAMUSCULAR; INTRAVENOUS
Status: COMPLETED | OUTPATIENT
Start: 2024-04-18 | End: 2024-04-18

## 2024-04-18 RX ORDER — METOCLOPRAMIDE 10 MG/1
10 TABLET ORAL ONCE
Status: COMPLETED | OUTPATIENT
Start: 2024-04-18 | End: 2024-04-18

## 2024-04-18 RX ADMIN — DEXAMETHASONE SODIUM PHOSPHATE 4 MG: 4 MG/ML VIAL (ML) INJECTION at 07:23:00

## 2024-04-18 RX ADMIN — SODIUM CHLORIDE, SODIUM LACTATE, POTASSIUM CHLORIDE, CALCIUM CHLORIDE: 600; 310; 30; 20 INJECTION, SOLUTION INTRAVENOUS at 09:01:00

## 2024-04-18 RX ADMIN — GLYCOPYRROLATE 0.2 MG: 0.2 INJECTION, SOLUTION INTRAMUSCULAR; INTRAVENOUS at 07:17:00

## 2024-04-18 RX ADMIN — MIDAZOLAM HYDROCHLORIDE 2 MG: 1 INJECTION INTRAMUSCULAR; INTRAVENOUS at 07:03:00

## 2024-04-18 RX ADMIN — ROCURONIUM BROMIDE 10 MG: 10 INJECTION, SOLUTION INTRAVENOUS at 07:18:00

## 2024-04-18 RX ADMIN — ROCURONIUM BROMIDE 10 MG: 10 INJECTION, SOLUTION INTRAVENOUS at 08:20:00

## 2024-04-18 RX ADMIN — DEXAMETHASONE SODIUM PHOSPHATE 4 MG: 10 INJECTION, SOLUTION INTRAMUSCULAR; INTRAVENOUS at 07:03:00

## 2024-04-18 RX ADMIN — ROPIVACAINE HYDROCHLORIDE 30 ML: 5 INJECTION, SOLUTION EPIDURAL; INFILTRATION; PERINEURAL at 07:03:00

## 2024-04-18 RX ADMIN — LIDOCAINE HYDROCHLORIDE 50 MG: 10 INJECTION, SOLUTION EPIDURAL; INFILTRATION; INTRACAUDAL; PERINEURAL at 07:17:00

## 2024-04-18 RX ADMIN — ONDANSETRON 4 MG: 2 INJECTION INTRAMUSCULAR; INTRAVENOUS at 07:23:00

## 2024-04-18 RX ADMIN — CEFAZOLIN SODIUM/WATER 2 G: 2 G/20 ML SYRINGE (ML) INTRAVENOUS at 07:30:00

## 2024-04-18 RX ADMIN — LIDOCAINE HYDROCHLORIDE 5 ML: 10 INJECTION, SOLUTION INFILTRATION; PERINEURAL at 07:03:00

## 2024-04-18 RX ADMIN — ROCURONIUM BROMIDE 20 MG: 10 INJECTION, SOLUTION INTRAVENOUS at 07:38:00

## 2024-04-18 RX ADMIN — SODIUM CHLORIDE, SODIUM LACTATE, POTASSIUM CHLORIDE, CALCIUM CHLORIDE: 600; 310; 30; 20 INJECTION, SOLUTION INTRAVENOUS at 07:13:00

## 2024-04-18 NOTE — OCCUPATIONAL THERAPY NOTE
OCCUPATIONAL THERAPY EVALUATION - INPATIENT     [facility]  Evaluation Date: 2024  Type of Evaluation: Quick Eval  Presenting Problem: s/p R TKA by Dr. Evans   Hx L TKA 2023     Physician Order: IP Consult to Occupational Therapy  Reason for Therapy: ADL/IADL Dysfunction and Discharge Planning    OCCUPATIONAL THERAPY ASSESSMENT   Patient is a 73 year old female admitted 2024 for  s/p R TKA by Dr. Evans .  Prior to admission, patient's baseline is independent.  Patient is currently functioning near baseline with  ADLs and fx mobility/transfers . Anticipate patient will progress to safely discharge home with additional support.    PLAN  Patient has been evaluated and presents with no skilled Occupational Therapy needs  at this time.  Patient will be discharged from Occupational Therapy services. Please re-order if a new functional limitation presents during this admission.     OCCUPATIONAL THERAPY MEDICAL/SOCIAL HISTORY   Problem List  Active Problems:    * No active hospital problems. *    HOME SITUATION  Type of Home: House  Home Layout: Two level; Able to live on main level  Lives With: Alone (family will assist at d/c)  Toilet and Equipment: Grab bar; Comfort height toilet  Shower/Tub and Equipment: Walk-in shower  Drives: Yes  Stairs in Home: 14 steps upstairs with railing  Use of Assistive Device(s): Owns RW    SUBJECTIVE  \"I'm very independent.\"    OCCUPATIONAL THERAPY EXAMINATION    OBJECTIVE  Fall Risk: Standard fall risk    WEIGHT BEARING RESTRICTION  R Lower Extremity: Weight Bearing as Tolerated    PAIN ASSESSMENT  Ratin    COGNITION  Overall Cognitive Status:  WFL - within functional limits    SENSATION  Light touch:  intact    RANGE OF MOTION   Upper extremity ROM is within functional limits     STRENGTH ASSESSMENT  Upper extremity strength is within functional limits     COORDINATION  Gross Motor: WFL   Fine Motor: WFL     ACTIVITIES OF DAILY LIVING  ASSESSMENT  AM-PAC ‘6-Clicks’ Inpatient Daily Activity Short Form  How much help from another person does the patient currently need…  -   Putting on and taking off regular lower body clothing?: A Little  -   Bathing (including washing, rinsing, drying)?: A Little  -   Toileting, which includes using toilet, bedpan or urinal? : A Little  -   Putting on and taking off regular upper body clothing?: None  -   Taking care of personal grooming such as brushing teeth?: None  -   Eating meals?: None    AM-PAC Score:  Score: 21  Approx Degree of Impairment: 32.79%  Standardized Score (AM-PAC Scale): 44.27  CMS Modifier (G-Code): CJ    BED MOBILITY  Supine to Sit: SUP    FUNCTIONAL TRANSFER ASSESSMENT  Sit to Stand from EOB: SUP  Toilet Transfer: SUP  Chair Transfer: SUP    FUNCTIONAL MOBILITY  SUP for hallway fx mobility using RW    FUNCTIONAL ADL ASSESSMENT  Eating: independent (per obs)   Grooming: supervision standing at sink  UB Dressing: independent  LB Dressing: supervision  Toileting: supervision seated     EDUCATION PROVIDED  Patient: Role of Occupational Therapy; Plan of Care; Discharge Recommendations; Functional Transfer Techniques; Fall Prevention; Weight Bear Status; Posture/Positioning; Energy Conservation; Compensatory ADL Techniques; Proper Body Mechanics  Patient's Response to Education: Verbalized Understanding; Returned Demonstration    The patient's Approx Degree of Impairment: 32.79% has been calculated based on documentation in the [facility] '6 clicks' Inpatient Daily Activity Short Form.  Research supports that patients with this level of impairment may benefit from discharge home without skilled OT needs.  Final disposition will be made by interdisciplinary medical team.    Patient End of Session: Needs met;RN aware of session/findings;All patient questions and concerns addressed;Family present (up in w/c)    Patient was able to achieve the following ...   Patient able to toilet transfer   SUP    Patient  able to dress lower extremities   SUP    Patient/Caregiver able to demonstrate safety with ADLS  at previous functional level     Patient Evaluation Complexity Level:   Occupational Profile/Medical History LOW - Brief history including review of medical or therapy records    Specific performance deficits impacting engagement in ADL/IADL LOW  1 - 3 performance deficits    Client Assessment/Performance Deficits LOW - No comorbidities nor modifications of tasks    Clinical Decision Making LOW - Analysis of occupational profile, problem-focused assessments, limited treatment options    Overall Complexity LOW     OT Session Time  Self-Care Home Management: 23 minutes    Hawa Gao OTR/L  [facility]  #[phone]

## 2024-04-18 NOTE — ANESTHESIA POSTPROCEDURE EVALUATION
Patient: Karis Joya    Procedure Summary       Date: 04/18/24 Room / Location: [facility] MAIN OR  / [facility] MAIN OR    Anesthesia Start: 0713 Anesthesia Stop:     Procedure: Right total knee arthroplasty (Right: Knee) Diagnosis: (Primary osteoarthritis of right knee)    Surgeons: Cam Evans MD Anesthesiologist: Gabino Iyer MD    Anesthesia Type: general ASA Status: 3            Anesthesia Type: general    Vitals Value Taken Time   /64 04/18/24 0914   Temp 97.1 °F (36.2 °C) 04/18/24 0914   Pulse 59 04/18/24 0914   Resp 16 04/18/24 0914   SpO2 97 % 04/18/24 0914       [facility] AN Post Evaluation:   Patient Evaluated in PACU  Patient Participation: complete - patient participated  Level of Consciousness: awake  Pain Score: 1  Pain Management: adequate  Airway Patency:patent  Dental exam unchanged from preop  Yes    Nausea/Vomiting: none  Cardiovascular Status: stable  Respiratory Status: nasal cannula  Postoperative Hydration stable  Comments: Accu check 168      Tamar Spencer CRNA  4/18/2024 9:14 AM

## 2024-04-18 NOTE — ANESTHESIA PREPROCEDURE EVALUATION
Anesthesia PreOp Note    HPI:     Karis Joya is a 73 year old female who presents for preoperative consultation requested by: Cam vEans MD    Date of Surgery: 4/18/2024    Procedure(s):  Right total knee arthroplasty  Indication: Primary osteoarthritis of right knee    Relevant Problems   No relevant active problems       NPO:  Last Liquid Consumption Date: 04/17/24  Last Liquid Consumption Time: 2030  Last Solid Consumption Date: 04/17/24  Last Solid Consumption Time: 2030  Last Liquid Consumption Date: 04/17/24          History Review:  Patient Active Problem List    Diagnosis Date Noted   • Obesity (BMI 30-39.9) 03/09/2022   • 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 without macular edema, with long-term current use of insulin ([facility]) 10/19/2020   • Controlled type 2 diabetes mellitus with other circulatory complication, with long-term current use of insulin ([facility]) 10/19/2020   • Controlled type 2 diabetes mellitus with diabetic peripheral angiopathy without gangrene, with long-term current use of insulin ([facility]) 10/19/2020   • Cardiac calcification ([facility]) 05/04/2020   • Statin intolerance 10/23/2019   • S/P right coronary artery (RCA) stent placement Stenting of  of RCA with OSIRIS x 3 in 12/2013 10/23/2019   • Agatston coronary artery calcium score greater than 400 10/23/2019   • Bilateral carotid artery stenosis 10/02/2019   • Hypertriglyceridemia 12/31/2018   • Pulmonary nodules 05/23/2018   • Primary osteoarthritis of both knees 05/14/2018   • Vitamin D deficiency 04/04/2018   • Atherosclerosis of native coronary artery of native heart without angina pectoris 03/23/2017   • Hypertension, essential, benign 01/11/2017   • Atherosclerosis of aorta ([facility]) 01/04/2017   • Long-term insulin use ([facility]) 08/25/2016   • Mixed hyperlipidemia 06/22/2016   • Hypothyroidism (acquired) 03/30/2016   • Peripheral vascular disease ([facility]) 02/10/2016   • Type 2  diabetes, controlled, with neuropathy (HCC) 10/14/2015   • Hyperlipidemia LDL goal <70 10/14/2015   • Nontoxic multinodular goiter 2012   • Anxiety 07/15/2010       Past Medical History:   • ANXIETY   • BP (high blood pressure)   • Cardiac calcification (HCC)    high score of 4503   • Carotid artery stenosis    chronic occlusion of the right; 1-15% of the left   • CORONARY ARTERY DISEASE    cath about 5-6 years ago showed 50% blockage with silent MI, now in 2010 has abnl stress test   • Coronary atherosclerosis   • Diabetes (HCC)   • Diabetic neuropathy (HCC)   • Heart attack (HCC)    silent   • History of cardiovascular stress test    normal   • Hypothyroidism   • Mixed hyperlipidemia   • Morbid obesity with BMI of 45.0-49.9, adult (HCC)   • Nonproliferative diabetic retinopathy (HCC)   • Nontoxic multinodular goiter    Dx in 2012 - Thyroid U/S showed a right lower pole nodule of 1.1 cm with peripheral calcification. Biopsy benign colloid nodule in 2012    • Obesity, unspecified   • Osteoarthritis   • Other and unspecified hyperlipidemia   • Pneumonia due to organism   • Pulmonary nodule    noted incidentally on CT scan   • Screening for osteoporosis    normal   • Stroke (HCC)    mini-stroke no residual effects   • Type 2 diabetes mellitus (HCC)    Dx at age 44       Past Surgical History:   Procedure Laterality Date   • Carpal tunnel release     • Cataract     • Cath drug eluting stent     •  delivery only       x2    • Colonoscopy  2015    repeat in 10 yrs per Dr. Nuñez   • Colonoscopy,diagnostic          • Coronary art dil,one vessel      angiogram    • D & c  2016    benign pathology (done by Dr. Shah for DUB)   • Fracture surgery Left     elbow   • Other surgical history  age 49    pilonidal cyst   • Removal of tonsils,12+ y/o  age 6       Medications Prior to Admission   Medication Sig Dispense Refill Last Dose   • Probiotic Product (ALIGN OR) Take by mouth.    4/11/2024   • Acetaminophen (TYLENOL ARTHRITIS PAIN OR) Take by mouth as needed.   4/17/2024 at 0900   • Tirzepatide (MOUNJARO) 7.5 MG/0.5ML Subcutaneous Solution Pen-injector Inject 7.5 mg into the skin once a week.   4/6/2024   • Azelastine HCl 0.1 % Nasal Solution 2 sprays by Nasal route 2 (two) times daily. (Patient taking differently: 2 sprays by Nasal route as needed.) 1 each 5 Past Week   • metoprolol succinate ER 50 MG Oral Tablet 24 Hr Take 1 tablet (50 mg total) by mouth daily. 90 tablet 3 4/18/2024 at 0430   • losartan 50 MG Oral Tab Take 1 tablet (50 mg total) by mouth daily. 90 tablet 3 4/17/2024 at 0900   • HUMALOG KWIKPEN 100 UNIT/ML Subcutaneous Solution Pen-injector INJECT A MAXIMUM OF 50 UNITS UNDER THE SKIN EVERY DAY AS DIRECTED 45 mL 0 4/16/2024   • guaiFENesin-codeine (CHERATUSSIN AC) 100-10 MG/5ML Oral Solution Take 5 mL by mouth 3 (three) times daily as needed for cough. 236 mL 0 4/17/2024 at 2100   • METFORMIN HCL  MG Oral Tablet 24 Hr TAKE 4 TABLETS BY MOUTH AT BEDTIME 360 tablet 1 4/16/2024   • LANTUS SOLOSTAR 100 UNIT/ML Subcutaneous Solution Pen-injector INJECT 25 UNITS UNDER THE SKIN WITH BREAKFAST AND 30 UNITS AT BEDTIME 63 mL 1 4/17/2024 at 2100   • rivaroxaban (XARELTO) 2.5 MG Oral Tab Take 1 tablet (2.5 mg total) by mouth 2 (two) times daily with meals. 180 tablet 3 4/15/2024 at 0900   • Levothyroxine Sodium 50 MCG Oral Tab Take 1 tablet (50 mcg total) by mouth every morning before breakfast. 90 tablet 3 4/17/2024 at 0600   • topiramate (TOPAMAX) 25 MG Oral Tab Take 1 tablet (25 mg total) by mouth 3 (three) times daily. 270 tablet 3 4/18/2024 at 0430   • Evolocumab (REPATHA SURECLICK) 140 MG/ML Subcutaneous Solution Auto-injector Inject 1 pen as directed every 14 (fourteen) days. 6 pen 3 4/5/2024   • Cholecalciferol (VITAMIN D) 2000 units Oral Tab Take 1 tablet daily   4/11/2024   • aspirin 81 MG Oral Tab EC Take 1 tablet (81 mg total) by mouth daily.   4/11/2024   • CALTRATE  600+D OR Take 1 tablet daily   4/11/2024   • MULTIVITAMIN OR Take 1 tablet daily   4/11/2024   • MUPIROCIN 2 % External Ointment APPLY EXTERNALLY TO THE AFFECTED AREA TWICE DAILY FOR 7 DAYS AS NEEDED 22 g 0 More than a month   • DEXCOM G6 TRANSMITTER Does not apply Misc 1 each by Does not apply route every 3 (three) months. 1 each 3    • DEXCOM G6 SENSOR Does not apply Misc 1 each by Does not apply route Every 10 days. 9 each 3    • CONTOUR NEXT TEST In Vitro Strip TEST BLOOD SUGAR FOUR TIMES DAILY 400 strip 1    • TECHLITE PEN NEEDLES 31G X 8 MM Does not apply Misc USE WITH INSULIN PENS FIVE TIMES DAILY AS DIRECTED 500 each 3    • ACCU-CHEK FASTCLIX LANCETS Does not apply Misc Check blood sugars 3 times per day 300 each 1    • Blood Glucose Calibration (ACCU-CHEK SMARTVIEW CONTROL) In Vitro Liquid Use with each new vial of strips 1 Each 2      Current Facility-Administered Medications Ordered in Epic   Medication Dose Route Frequency Provider Last Rate Last Admin   • lactated ringers infusion   Intravenous Continuous Cam Evans MD       • acetaminophen (Tylenol Extra Strength) tab 1,000 mg  1,000 mg Oral Once Cam Evans MD       • metoprolol tartrate (Lopressor) tab 25 mg  25 mg Oral Once PRN Cam Evans MD       • famotidine (Pepcid) tab 20 mg  20 mg Oral Once Cam Evans MD        Or   • famotidine (Pepcid) 20 mg/2mL injection 20 mg  20 mg Intravenous Once Cam Evans MD       • metoclopramide (Reglan) tab 10 mg  10 mg Oral Once Cam Evans MD        Or   • metoclopramide (Reglan) 5 mg/mL injection 10 mg  10 mg Intravenous Once Cam Evans MD       • tranexamic acid (Lysteda) tab 1,300 mg  1,300 mg Oral Once Cam Evans MD       • ceFAZolin (Ancef) 2 g in 20mL IV syringe premix  2 g Intravenous Once Cam Evans MD       • clonidine-EPINEPHrine-ropivacaine-ketorolac (CERTS) (Duraclon-Adrenalin-Naropin-Toradol) pain cocktail irrigation   Intra-articular Once (Intra-Op) Nathan  MD Cam         No current [facility]-ordered outpatient medications on file.       Allergies   Allergen Reactions   • Augmentin [Amoxicillin-Pot Clavulanate] DIARRHEA and NAUSEA AND VOMITING     Other reaction(s): Gastritis   • Ace Inhibitors Coughing       Family History   Problem Relation Age of Onset   • Lung Disorder Father    • Diabetes Father         Type 2 DM   • Other (Other) Father    • Cancer Mother         throat   • Heart Disorder Mother         CAD, hx of rheumatic fever   • Diabetes Mother         Type 2 DM   • Hypertension Mother    • Allergies Son    • Diabetes Maternal Grandmother         Type 2 DM   • Other (Other) Sister         Osteoporosis'   • Cancer Brother    • Thyroid Disorder Neg      Social History     Socioeconomic History   • Marital status:    • Number of children: 2   Occupational History   • Occupation: Teacher     Comment: [employer]    Tobacco Use   • Smoking status: Former     Current packs/day: 0.00     Types: Cigarettes     Quit date: 2003     Years since quittin.4   • Smokeless tobacco: Never   • Tobacco comments:     Social smoker   Vaping Use   • Vaping status: Never Used   Substance and Sexual Activity   • Alcohol use: Not Currently     Alcohol/week: 0.0 - 2.5 standard drinks of alcohol     Comment: monthly   • Drug use: Never       Available pre-op labs reviewed.     Lab Results   Component Value Date    PGLU 148 (H) 2024          Vital Signs:  Body mass index is 36.39 kg/m².   height is 1.626 m (5' 4\") and weight is 96.2 kg (212 lb).   Vitals:    04/15/24 1158   Weight: 96.2 kg (212 lb)   Height: 1.626 m (5' 4\")        Anesthesia Evaluation     Patient summary reviewed and Nursing notes reviewed    No history of anesthetic complications   Airway   Mallampati: III  TM distance: <3 FB  Neck ROM: full  Dental          Pulmonary - negative ROS and normal exam   Cardiovascular - normal exam  (+) hypertension, CAD    ROS comment: Stress      Neuro/Psych    (+)  CVA, anxiety/panic attacks,        GI/Hepatic/Renal - negative ROS     Endo/Other    (+) diabetes mellitus  Abdominal                Anesthesia Plan:   ASA:  3  Plan:   General  Airway:  ETT and Video laryngoscope  Post-op Pain Management: Saphenous block and IV analgesics  Plan Comments: I have discussed the anesthetic plan, major risks and alternatives with the patient and answered all questions. The patient desires to proceed with surgery and anesthesia as planned.     Xarelto 4/15 @ 0900 which is <72hrs, plan adductor/GA  Informed Consent Plan and Risks Discussed With:  Patient  Discussed plan with:  CRNA      I have informed Karis Joya and/or legal guardian or family member of the nature of the anesthetic plan, benefits, risks including possible dental damage if relevant, major complications, and any alternative forms of anesthetic management.   All of the patient's questions were answered to the best of my ability. The patient desires the anesthetic management as planned.  Gabino Iyer MD  4/18/2024 6:37 AM  Present on Admission:  **None**

## 2024-04-18 NOTE — DISCHARGE INSTRUCTIONS
HOME INSTRUCTIONS    Weight Bearing As Tolerated     Norco 10mg for pain     Resume home dose of xarelto 2.5mg bid(two times daily) for DVT prophylaxis     Okay to shower     Follow-up in 2 weeks as scheduled        [facility] CARE INSTRUCTIONS: POST-OP ANESTHESIA  The medication that you received for sedation or general anesthesia can last up to 24 hours. Your judgment and reflexes may be altered, even if you feel like your normal self.      We Recommend:   Do not drive any motor vehicle or bicycle   Avoid mowing the lawn, playing sports, or working with power tools/applicances (power saws, electric knives or mixers)   That you have someone stay with you on your first night home   Do not drink alcohol or take sleeping pills or tranquilizers   Do not sign legal documents within 24 hours of your procedure   If you had a nerve block for your surgery, take extra care not to put any pressure on your arm or hand for 24 hours    It is normal:  For you to have a sore throat if you had a breathing tube during surgery (while you were asleep!). The sore throat should get better within 48 hours. You can gargle with warm salt water (1/2 tsp in 4 oz warm water) or use a throat lozenge for comfort  To feel muscle aches or soreness especially in the abdomen, chest or neck. The achy feeling should go away in the next 24 hours  To feel weak, sleepy or \"wiped out\". Your should start feeling better in the next 24 hours.   To experience mild discomforts such as sore lip or tongue, headache, cramps, gas pains or a bloated feeling in your abdomen.   To experience mild back pain or soreness for a day or two if you had spinal or epidural anesthesia.   If you had laparoscopic surgery, to feel shoulder pain or discomfort on the day of surgery.   For some patients to have nausea after surgery/anesthesia    If you feel nausea or experience vomiting:   Try to move around less.   Eat less than usual or drink only liquids until the next morning    Nausea should resolve in about 24 hours    If you have a problem when you are at home:    Call your surgeons office +      Discharge Instructions: After Your Surgery  You’ve just had surgery. During surgery, you were given medicine called anesthesia to keep you relaxed and free of pain. After surgery, you may have some pain or nausea. This is common. Here are some tips for feeling better and getting well after surgery.   Going home  Your healthcare provider will show you how to take care of yourself when you go home. They'll also answer your questions. Have an adult family member or friend drive you home. For the first 24 hours after your surgery:   Don't drive or use heavy equipment.  Don't make important decisions or sign legal papers.  Take medicines as directed.  Don't drink alcohol.  Have someone stay with you, if needed. They can watch for problems and help keep you safe.  Be sure to go to all follow-up visits with your healthcare provider. And rest after your surgery for as long as your provider tells you to.   Coping with pain  If you have pain after surgery, pain medicine will help you feel better. Take it as directed, before pain becomes severe. Also, ask your healthcare provider or pharmacist about other ways to control pain. This might be with heat, ice, or relaxation. And follow any other instructions your surgeon or nurse gives you.      Stay on schedule with your medicine.     Tips for taking pain medicine  To get the best relief possible, remember these points:   Pain medicines can upset your stomach. Taking them with a little food may help.  Most pain relievers taken by mouth need at least 20 to 30 minutes to start to work.  Don't wait till your pain becomes severe before you take your medicine. Try to time your medicine so that you can take it before starting an activity. This might be before you get dressed, go for a walk, or sit down for dinner.  Constipation is a common side effect of some  pain medicines. Call your healthcare provider before taking any medicines such as laxatives or stool softeners to help ease constipation. Also ask if you should skip any foods. Drinking lots of fluids and eating foods such as fruits and vegetables that are high in fiber can also help. Remember, don't take laxatives unless your surgeon has prescribed them.  Drinking alcohol and taking pain medicine can cause dizziness and slow your breathing. It can even be deadly. Don't drink alcohol while taking pain medicine.  Pain medicine can make you react more slowly to things. Don't drive or run machinery while taking pain medicine.  Your healthcare provider may tell you to take acetaminophen to help ease your pain. Ask them how much you're supposed to take each day. Acetaminophen or other pain relievers may interact with your prescription medicines or other over-the-counter (OTC) medicines. Some prescription medicines have acetaminophen and other ingredients in them. Using both prescription and OTC acetaminophen for pain can cause you to accidentally overdose. Read the labels on your OTC medicines with care. This will help you to clearly know the list of ingredients, how much to take, and any warnings. It may also help you not take too much acetaminophen. If you have questions or don't understand the information, ask your pharmacist or healthcare provider to explain it to you before you take the OTC medicine.   Managing nausea  Some people have an upset stomach (nausea) after surgery. This is often because of anesthesia, pain, or pain medicine, less movement of food in the stomach, or the stress of surgery. These tips will help you handle nausea and eat healthy foods as you get better. If you were on a special food plan before surgery, ask your healthcare provider if you should follow it while you get better. Check with your provider on how your eating should progress. It may depend on the surgery you had. These general tips  may help:   Don't push yourself to eat. Your body will tell you when to eat and how much.  Start off with clear liquids and soup. They're easier to digest.  Next try semi-solid foods as you feel ready. These include mashed potatoes, applesauce, and gelatin.  Slowly move to solid foods. Don’t eat fatty, rich, or spicy foods at first.  Don't force yourself to have 3 large meals a day. Instead eat smaller amounts more often.  Take pain medicines with a small amount of solid food, such as crackers or toast. This helps prevent nausea.  When to call your healthcare provider  Call your healthcare provider right away if you have any of these:   You still have too much pain, or the pain gets worse, after taking the medicine. The medicine may not be strong enough. Or there may be a complication from the surgery.  You feel too sleepy, dizzy, or groggy. The medicine may be too strong.  Side effects such as nausea or vomiting. Your healthcare provider may advise taking other medicines to .  Skin changes such as rash, itching, or hives. This may mean you have an allergic reaction. Your provider may advise taking other medicines.  The incision looks different (for instance, part of it opens up).  Bleeding or fluid leaking from the incision site, and weren't told to expect that.  Fever of 100.4°F (38°C) or higher, or as directed by your provider.  Call 911  Call 911 right away if you have:   Trouble breathing  Facial swelling    If you have obstructive sleep apnea   You were given anesthesia medicine during surgery to keep you comfortable and free of pain. After surgery, you may have more apnea spells because of this medicine and other medicines you were given. The spells may last longer than normal.    At home:  Keep using the continuous positive airway pressure (CPAP) device when you sleep. Unless your healthcare provider tells you not to, use it when you sleep, day or night. CPAP is a common device used to treat obstructive  sleep apnea.  Talk with your provider before taking any pain medicine, muscle relaxants, or sedatives. Your provider will tell you about the possible dangers of taking these medicines.  Contact your provider if your sleeping changes a lot even when taking medicines as directed.  [employer] last reviewed this educational content on 10/1/2021  © 4803-6858 [employer]. All rights reserved. This information is not intended as a substitute for professional medical care. Always follow your healthcare professional's instructions.      Home Health  [facility] [phone]

## 2024-04-18 NOTE — H&P
History & Physical Examination    Patient Name: Karis Joya  MRN: [id number]  [facility]: [id number]  YOB: 1950    Diagnosis: RIGHT KNEE OA    Present Illness: FAILED CONSERVATIVE MEASURES    Medications Prior to Admission   Medication Sig Dispense Refill Last Dose    Probiotic Product (ALIGN OR) Take by mouth.   4/11/2024    Acetaminophen (TYLENOL ARTHRITIS PAIN OR) Take by mouth as needed.   4/17/2024 at 0900    Tirzepatide (MOUNJARO) 7.5 MG/0.5ML Subcutaneous Solution Pen-injector Inject 7.5 mg into the skin once a week.   4/6/2024    Azelastine HCl 0.1 % Nasal Solution 2 sprays by Nasal route 2 (two) times daily. (Patient taking differently: 2 sprays by Nasal route as needed.) 1 each 5 Past Week    metoprolol succinate ER 50 MG Oral Tablet 24 Hr Take 1 tablet (50 mg total) by mouth daily. 90 tablet 3 4/18/2024 at 0430    losartan 50 MG Oral Tab Take 1 tablet (50 mg total) by mouth daily. 90 tablet 3 4/17/2024 at 0900    HUMALOG KWIKPEN 100 UNIT/ML Subcutaneous Solution Pen-injector INJECT A MAXIMUM OF 50 UNITS UNDER THE SKIN EVERY DAY AS DIRECTED 45 mL 0 4/16/2024    guaiFENesin-codeine (CHERATUSSIN AC) 100-10 MG/5ML Oral Solution Take 5 mL by mouth 3 (three) times daily as needed for cough. 236 mL 0 4/17/2024 at 2100    METFORMIN HCL  MG Oral Tablet 24 Hr TAKE 4 TABLETS BY MOUTH AT BEDTIME 360 tablet 1 4/16/2024    LANTUS SOLOSTAR 100 UNIT/ML Subcutaneous Solution Pen-injector INJECT 25 UNITS UNDER THE SKIN WITH BREAKFAST AND 30 UNITS AT BEDTIME 63 mL 1 4/17/2024 at 2100    rivaroxaban (XARELTO) 2.5 MG Oral Tab Take 1 tablet (2.5 mg total) by mouth 2 (two) times daily with meals. 180 tablet 3 4/15/2024 at 0900    Levothyroxine Sodium 50 MCG Oral Tab Take 1 tablet (50 mcg total) by mouth every morning before breakfast. 90 tablet 3 4/17/2024 at 0600    topiramate (TOPAMAX) 25 MG Oral Tab Take 1 tablet (25 mg total) by mouth 3 (three) times daily. 270 tablet 3 4/18/2024 at 0430    Evolocumab  (REPATHA SURECLICK) 140 MG/ML Subcutaneous Solution Auto-injector Inject 1 pen as directed every 14 (fourteen) days. 6 pen 3 4/5/2024    Cholecalciferol (VITAMIN D) 2000 units Oral Tab Take 1 tablet daily   4/11/2024    aspirin 81 MG Oral Tab EC Take 1 tablet (81 mg total) by mouth daily.   4/11/2024    CALTRATE 600+D OR Take 1 tablet daily   4/11/2024    MULTIVITAMIN OR Take 1 tablet daily   4/11/2024    MUPIROCIN 2 % External Ointment APPLY EXTERNALLY TO THE AFFECTED AREA TWICE DAILY FOR 7 DAYS AS NEEDED 22 g 0 More than a month    DEXCOM G6 TRANSMITTER Does not apply Misc 1 each by Does not apply route every 3 (three) months. 1 each 3     DEXCOM G6 SENSOR Does not apply Misc 1 each by Does not apply route Every 10 days. 9 each 3     CONTOUR NEXT TEST In Vitro Strip TEST BLOOD SUGAR FOUR TIMES DAILY 400 strip 1     TECHLITE PEN NEEDLES 31G X 8 MM Does not apply Misc USE WITH INSULIN PENS FIVE TIMES DAILY AS DIRECTED 500 each 3     ACCU-CHEK FASTCLIX LANCETS Does not apply Misc Check blood sugars 3 times per day 300 each 1     Blood Glucose Calibration (ACCU-CHEK SMARTVIEW CONTROL) In Vitro Liquid Use with each new vial of strips 1 Each 2      Current Facility-Administered Medications   Medication Dose Route Frequency    lactated ringers infusion   Intravenous Continuous    metoprolol tartrate (Lopressor) tab 25 mg  25 mg Oral Once PRN    ceFAZolin (Ancef) 2 g in 20mL IV syringe premix  2 g Intravenous Once    clonidine-EPINEPHrine-ropivacaine-ketorolac (CERTS) (Duraclon-Adrenalin-Naropin-Toradol) pain cocktail irrigation   Intra-articular Once (Intra-Op)       Allergies:   Allergies   Allergen Reactions    Augmentin [Amoxicillin-Pot Clavulanate] DIARRHEA and NAUSEA AND VOMITING     Other reaction(s): Gastritis    Ace Inhibitors Coughing       Past Medical History:    ANXIETY    BP (high blood pressure)    Cardiac calcification (HCC)    high score of 4503    Carotid artery stenosis    chronic occlusion of the  right; 1-15% of the left    CORONARY ARTERY DISEASE    cath about 5-6 years ago showed 50% blockage with silent MI, now in 2010 has abnl stress test    Coronary atherosclerosis    Diabetes (HCC)    Diabetic neuropathy (HCC)    Heart attack (HCC)    silent    History of cardiovascular stress test    normal    Hypothyroidism    Mixed hyperlipidemia    Morbid obesity with BMI of 45.0-49.9, adult (HCC)    Nonproliferative diabetic retinopathy (HCC)    Nontoxic multinodular goiter    Dx in 2012 - Thyroid U/S showed a right lower pole nodule of 1.1 cm with peripheral calcification. Biopsy benign colloid nodule in 2012     Obesity, unspecified    Osteoarthritis    Other and unspecified hyperlipidemia    Pneumonia due to organism    Pulmonary nodule    noted incidentally on CT scan    Screening for osteoporosis    normal    Stroke (HCC)    mini-stroke no residual effects    Type 2 diabetes mellitus (HCC)    Dx at age 44     Past Surgical History:   Procedure Laterality Date    Carpal tunnel release      Cataract      Cath drug eluting stent       delivery only       x2     Colonoscopy  2015    repeat in 10 yrs per Dr. Nuñez    Colonoscopy,diagnostic           Coronary art dil,one vessel      angiogram     D & c  2016    benign pathology (done by Dr. Shah for DUB)    Fracture surgery Left     elbow    Other surgical history  age 49    pilonidal cyst    Removal of tonsils,12+ y/o  age 6     Family History   Problem Relation Age of Onset    Lung Disorder Father     Diabetes Father         Type 2 DM    Other (Other) Father     Cancer Mother         throat    Heart Disorder Mother         CAD, hx of rheumatic fever    Diabetes Mother         Type 2 DM    Hypertension Mother     Allergies Son     Diabetes Maternal Grandmother         Type 2 DM    Other (Other) Sister         Osteoporosis'    Cancer Brother     Thyroid Disorder Neg      Social History     Tobacco Use    Smoking status:  Former     Current packs/day: 0.00     Types: Cigarettes     Quit date: 2003     Years since quittin.4    Smokeless tobacco: Never    Tobacco comments:     Social smoker   Substance Use Topics    Alcohol use: Not Currently     Alcohol/week: 0.0 - 2.5 standard drinks of alcohol     Comment: monthly       SYSTEM Check if Review is Normal Check if Physical Exam is Normal If not normal, please explain:   HEENT [ x] [ x]    NECK & BACK [ x] [ x]    HEART [ x] [ x]    LUNGS [ x] [ x]    ABDOMEN [ x] [ x]    UROGENITAL [ x] [ x]    EXTREMITIES [ ] [ ] R +JLT, +effusion   OTHER        [ x ] I have discussed the risks and benefits and alternatives with the patient/family.  They understand and agree to proceed with plan of care.  [ x ] I have reviewed the History and Physical done within the last 30 days.  Any changes noted above.    PLAN: RIGHT TKA    Cam Evans MD  2024  6:53 AM

## 2024-04-18 NOTE — CM/SW NOTE
SW followed up on DC planning.     Pt is pre ortho with [facility]    Orders/F2F entered    [facility] [phone]    PLAN: home with [facility]    Kathia Hamm, KARENAW, MSW ext. [phone]

## 2024-04-18 NOTE — OPERATIVE REPORT
PATIENT'S NAME: Karis Joya    ATTENDING PHYSICIAN: Cam Evans MD   OPERATING PHYSICIAN: Cam Evans MD   PATIENT ACCOUNT#: [id number]   MEDICAL RECORD #: [id number]   YOB: 1950          ADMISSION DATE: 4/18/2024           OPERATION DATE: 4/18/2024       OPERATIVE REPORT        PREOPERATIVE DIAGNOSIS: Right knee osteoarthritis.  POSTOPERATIVE DIAGNOSIS:  Right knee osteoarthritis.  PROCEDURE:  Robotic assisted right total knee arthroplasty.     COMPLICATIONS:  None.     ANESTHESIA:  Spinal plus adductor block.     SPECIMENS REMOVED:  Bony components sent to Pathology.     IMPLANTS USED:  Elizabeth Persona femoral component size 9 right narrow CR; tibial component, size D with stem; all poly patella, size  35; articular insert, size 12.       TOURNIQUET TIME:  approx 65 minutes.     BLOOD LOSS:  Approximately 100 mL.     ASSISTANTS:  Pablo Mckeon RSA; Dayami GARCIA     INDICATIONS:  The patient is a pleasant 73-year-old woman who has failed nonoperative treatment of right knee osteoarthritis.  We discussed risks and benefits of operative intervention going forward with the surgery to include infection, stiffness, neurovascular injury, anesthetic risk, continued pain, possible need for further surgery, DVT, PE. She understood these risks and desired to proceed.     OPERATIVE TECHNIQUE:  After adequate induction of spinal anesthesia and adductor block, right lower extremity was prepped and draped in the usual sterile fashion after application of well-padded tourniquet to the right upper thigh.  Prior to the case, the leg was exsanguinated, tourniquet taken up to 250 mmHg.  At this point, a standard midline incision was made, followed by a medial parapatellar arthrotomy.  Patella was everted and knee was flexed.  Fat pad was removed.  ACL was removed.  At this time, tracking pins were placed.  The robotic arm was then used to placed distal femoral pins, drill 4-in-1 cutting holes, and  place proximal tibial pins.  Distal cut was made.  A 4-in-1 cutting block was placed.  Anterior, posterior, and chamfer cuts were then made.  PCL retractor was used to gain access to the proximal tibia, and menisci were then removed at this time. Proximal tibia cut was made.  Flexion-extension gaps were found to be stable at approximately 12 mm.  At this time, a tibial tray was placed in appropriate rotation and pinned.  Femoral component was placed and pinned.  Trial polyethylene was placed.  The patella was rongeured free of osteophytes, reamed down to appropriate size.  A 35 mm patellar button was then medialized once peg holes were drilled.  Knee was taken through range of motion and the patella was found to track well.  At this time, the femur was prepared with a drill.  Tibia was prepared with a drill and punch.  All trials were removed.  Bony surfaces were irrigated while cement was mixed and allowed to fully harden.  Cement was first placed about the tibia, followed by the femur, then the patella.  Components were then placed in this order, followed by a trial insert.  Excess cement was removed with a scraper.  Once the cement hardened, the trial polyethylene was removed.  The final polyethylene was placed after appropriate irrigation.  Knee was irrigated thoroughly one additional time.  The arthrotomy was closed with #1 Ethibond, subcutaneous tissue with 2-0 Vicryl, skin with staples.  It should be noted that prior to closure, pain cocktail was used for local anesthetic.  Sterile dressings were applied.  The patient tolerated the procedure well.  She was taken to PACU in stable condition.  Please note that prior to the case a time-out was completed, correct site was identified, and preoperative antibiotics and TXA were given.  Please also note that assistants were required for the case to help with different sawing and drilling techniques.      Cam Evans MD

## 2024-04-18 NOTE — PHYSICAL THERAPY NOTE
PHYSICAL THERAPY EVALUATION - INPATIENT     Room Number: [facility]  Evaluation Date: 2024  Type of Evaluation: Initial   Physician Order: PT Eval and Treat    Presenting Problem: s/p right TKA 24  Co-Morbidities : Hx L TKA  2023  Reason for Therapy: Mobility Dysfunction and Discharge Planning    PHYSICAL THERAPY ASSESSMENT   Patient is a 73 year old female admitted 2024 for elective right TKA.  Prior to admission, patient's baseline is independent.   Patient educated in use of towel roll under right ankle to promote right knee terminal extension, educated in importance of achieving ROM in a timely fashion. Patient also educated in rw use on levels, ambulation on stairs, transfers and bed mobility, and is currently performing at mod ind/supervision level for all functional mobility, and appears safe for dc home with supportive family. Pt has all DME.   Pt reports she has arranged home PT to be performed by her previous OPPT.      PLAN  Patient has been evaluated and presents with no skilled Physical Therapy needs at this time.  Patient will be discharged from Physical Therapy services. Please re-order if a new functional limitation presents during this admission.    PHYSICAL THERAPY MEDICAL/SOCIAL HISTORY       Problem List  Active Problems:    * No active hospital problems. *      HOME SITUATION  Home Situation  Type of Home: House  Home Layout: Two level;Able to live on main level  Stairs to Enter : 2  Lives With: Alone (family will assist at d/c)  Drives: Yes  Patient Owned Equipment: Rolling walker     Prior Level of [place]: independent without use of assistive device      PHYSICAL THERAPY EXAMINATION   OBJECTIVE     Fall Risk: Standard fall risk    WEIGHT BEARING RESTRICTION  Weight Bearing Restriction: R lower extremity        R Lower Extremity: Weight Bearing as Tolerated       PAIN ASSESSMENT  Ratin  Location: right knee  Management Techniques: Activity  promotion  COGNITION  Overall Cognitive Status:  WFL - within functional limits    RANGE OF MOTION AND STRENGTH ASSESSMENT  Upper extremity ROM and strength are within functional limits   Lower extremity ROM is within functional limits except right knee  Lower extremity strength is within functional limits     BALANCE  Static Sitting: Good  Dynamic Sitting: Good  Static Standing: Fair  Dynamic Standing: Fair      AM-PAC '6-Clicks' INPATIENT SHORT FORM - BASIC MOBILITY  How much difficulty does the patient currently have...  Patient Difficulty: Turning over in bed (including adjusting bedclothes, sheets and blankets)?: None   Patient Difficulty: Sitting down on and standing up from a chair with arms (e.g., wheelchair, bedside commode, etc.): None   Patient Difficulty: Moving from lying on back to sitting on the side of the bed?: None   How much help from another person does the patient currently need...   Help from Another: Moving to and from a bed to a chair (including a wheelchair)?: None   Help from Another: Need to walk in hospital room?: A Little   Help from Another: Climbing 3-5 steps with a railing?: A Little     AM-PAC Score:  Raw Score: 22   Approx Degree of Impairment: 20.91%   Standardized Score (AM-PAC Scale): 53.28   CMS Modifier (G-Code): CJ    FUNCTIONAL ABILITY STATUS  Functional Mobility/Gait Assessment  Gait Assistance: Supervision  Distance (ft): 100  Assistive Device: Rolling walker  Pattern: R Decreased stance time  Stairs: Stairs  How Many Stairs: 4  Device: 2 Rails  Assist: Supervision    Exercise/Education Provided:  Energy conservation  Functional activity tolerated  Gait training  Posture  ROM  Strengthening  Transfer training    Patient ok to see per rn.    Pt recd in supine, children present, educated in role of PT, goals for session, importance of consistent mobility, ankle pumps for DVT prevention, use of ice for edema control. Pt agreeable to PT.   Pt educated in and performed LE therex  per TKA protocol, issued written HEP.   Pt transferred supine to sit with no assist, no dizziness  Pt provided verbal instruction and demonstration of rw use, stood to rw with supervision, able to perform right TKE, marching in place with no right knee buckling, instructed in gait sequencing.  Pt amb with rw with gait training emphasis on heel toe pattern, upright posture, with allowance for right knee flexion during swing phase of gait.  Pt tolerated ambulation well, able to progress to step through gait pattern. Pt also instructed in stair ambulation, ascended/descended 4 stairs with step to gait pattern and use of railing, good tolerance with demonstration of good right quad control.   Pt returned to cart, left with OT.  Pt educated in use of towel roll under right ankle to promote right knee terminal extension, educated in importance of achieving ROM in a timely fashion.  Discussed session with RN.      THERAPEUTIC EXERCISES  Lower Extremity Ankle pumps  Heel slides  LAQ  Quad sets     Position Sitting and Supine         The patient's Approx Degree of Impairment: 20.91% has been calculated based on documentation in the [facility] '6 clicks' Inpatient Basic Mobility Short Form.  Research supports that patients with this level of impairment may benefit from home, pt reports she has arranged home PT.  Final disposition will be made by interdisciplinary medical team.    Patient End of Session: In bed;With  staff;Needs met;Call light within reach;RN aware of session/findings;Family present    Patient was able to achieve the following ...   Patient able to transfer  At previous, functional level    Patient able to ambulate on level surfaces  At previous, functional level     Patient Evaluation Complexity Level:  History Low - no personal factors and/or co-morbidities   Examination of body systems Low -  addressing 1-2 elements   Clinical Presentation Low- Stable   Clinical Decision Making  Low Complexity     Therapeutic  Activity:  15 minutes

## 2024-04-18 NOTE — ANESTHESIA PROCEDURE NOTES
Peripheral Block    Date/Time: 4/18/2024 7:03 AM    Performed by: Gabino Iyer MD  Authorized by: Gabino Iyer MD      General Information and Staff    Start Time:  4/18/2024 7:03 AM  End Time:  4/18/2024 7:06 AM  Anesthesiologist:  Gabino Iyer MD  Performed by:  Anesthesiologist  Patient Location:  PACU    Block Placement: Pre Induction  Site Identification: real time ultrasound guided and image stored and retrievable    Block site/laterality marked before start: site marked  Reason for Block: at surgeon's request and post-op pain management    Preanesthetic Checklist: 2 patient identifers, IV checked, risks and benefits discussed, monitors and equipment checked, pre-op evaluation, timeout performed, anesthesia consent, sterile technique used, no prohibitive neurological deficits and no local skin infection at insertion site      Procedure Details    Patient Position:  Supine  Prep: ChloraPrep    Monitoring:  Heart rate, cardiac monitor, continuous pulse ox and blood pressure cuff  Block Type:  Adductor canal  Laterality:  Right  Injection Technique:  Single-shot    Needle    Needle Type:  Short-bevel and echogenic  Needle Gauge:  21 G  Needle Length:  90 mm  Needle Localization:  Ultrasound guidance  Reason for Ultrasound Use: appropriate spread of the medication was noted in real time and no ultrasound evidence of intravascular and/or intraneural injection            Assessment    Injection Assessment:  Good spread noted, incremental injection, low pressure, negative aspiration for heme, negative resistance and no pain on injection  - Patient tolerated block procedure well without evidence of immediate block related complications.     Medications  4/18/2024 7:03 AM  midazolam (VERSED)injection 2mg/2ml - Intravenous   2 mg - 4/18/2024 7:03:00 AM  lidocaine injection 1% - Intradermal   5 mL - 4/18/2024 7:03:00 AM  ropivacaine (NAROPIN) injection 0.5% - Infiltration   30 mL - 4/18/2024  7:03:00 AM  dexamethasone (DECADRON) PF injection 10 mg/ml - Injection   4 mg - 4/18/2024 7:03:00 AM    Additional Comments

## 2024-04-18 NOTE — ANESTHESIA PROCEDURE NOTES
Airway  Date/Time: 4/18/2024 7:20 AM  Urgency: Elective    Difficult airway: small mouth opening. Poor dentition.    General Information and Staff    Patient location during procedure: OR  Anesthesiologist: Gabino Iyer MD  Resident/CRNA: Tamar Spencer CRNA  Performed: CRNA and anesthesiologist   Performed by: Tamar Spencer CRNA  Authorized by: Tamar Spencer CRNA      Indications and Patient Condition  Indications for airway management: anesthesia  Sedation level: deep  Preoxygenated: yes  Patient position: sniffing  Mask difficulty assessment: 1 - vent by mask (Easily)    Final Airway Details  Final airway type: endotracheal airway      Successful airway: ETT  Cuffed: yes   Successful intubation technique: Video laryngoscopy  Facilitating devices/methods: intubating stylet  Endotracheal tube insertion site: oral  Blade type: Golden MAC 3.  Blade size: #3  ETT size (mm): 7.0    Cormack-Lehane Classification: grade I - full view of glottis  Placement verified by: capnometry   Cuff volume (mL): 7  Measured from: teeth  ETT to teeth (cm): 21  Number of attempts at approach: 1    Additional Comments  Upon inspection and opening of mouth, it was noted by myself and Dr. Iyer that entire right upper section of teeth were loose and \"connected\" to eachother, indicating an implant of some sort that the patient denied pre- operatively to Dr. Iyer. Pt intubated easily and teeth remain as they were pre-op.

## (undated) DEVICE — Device: Brand: STABLECUT®

## (undated) DEVICE — 3M™ IOBAN™ 2 ANTIMICROBIAL INCISE DRAPE 6651EZ: Brand: IOBAN™ 2

## (undated) DEVICE — BOWL CEMENT MIX QUICK-VAC

## (undated) DEVICE — PIN FIX 3.2X150MM FLUT CAS ORTHOSOFT

## (undated) DEVICE — SUT ETHBND XL 1 30IN OS-8 NABSRB GRN 40MM 1/2

## (undated) DEVICE — PIN DRL 3.2MM 2.5MM 75MM STRL

## (undated) DEVICE — CURAD OIL EMLUSION GAUZE 3X16

## (undated) DEVICE — KIT NAVIGATION KNEE NIVITRACK

## (undated) DEVICE — 60 ML SYRINGE LUER-LOCK TIP: Brand: MONOJECT

## (undated) DEVICE — BOWL BNE CEM MIX DISP QUIK-VAC

## (undated) DEVICE — PIN FIX L80MM DIA3.2MM FLUT CAS ORTHOSOFT

## (undated) DEVICE — SOLUTION  .9 3000ML

## (undated) DEVICE — HOOD: Brand: FLYTE

## (undated) DEVICE — DRESSING AQUACEL AG SP 3.5X10

## (undated) DEVICE — WRAP COOLING KNEE W/ICE PILLOW

## (undated) DEVICE — Device

## (undated) DEVICE — NEEDLE SPNL 18GA L3.5IN W/ QNCKE SHARPER BVL

## (undated) DEVICE — APPLICATOR CHLORAPREP 26ML

## (undated) DEVICE — KIT NAVITRACKER KNEE AND SPINE DISP ORTHOSOFT

## (undated) DEVICE — PIN FIX 80MM DIA3.2MM FLUTED

## (undated) DEVICE — PROXIMATE SKIN STAPLERS (35 WIDE) CONTAINS 35 STAINLESS STEEL STAPLES (FIXED HEAD): Brand: PROXIMATE

## (undated) DEVICE — SCREW ORTH 2.5X25MM FEM HEX PERSONA

## (undated) DEVICE — COTTON UNDERCAST PADDING,REGULAR FINISH: Brand: WEBRIL

## (undated) DEVICE — OR TOWEL, 17" X 26" STERILE, BLUE: Brand: PREMIERPRO

## (undated) DEVICE — SPINOCAN® 18 GA. X 3-1/2 IN. (90 MM) SPINAL NEEDLE: Brand: SPINOCAN®

## (undated) DEVICE — DRESSING AQUACEL AG SP 3.5X12

## (undated) DEVICE — DRAPE ROBOTIC UNIT DISP ROSA

## (undated) DEVICE — DRESSING WET L16XW3IN OIL EMUL N ADH CURAD

## (undated) DEVICE — ENCORE® LATEX MICRO SIZE 7.5, STERILE LATEX POWDER-FREE SURGICAL GLOVE: Brand: ENCORE

## (undated) DEVICE — DRAPE SHEET LARGE 76X55

## (undated) DEVICE — SCREW FIX 3.5X48MM HEX HD

## (undated) DEVICE — SHEET,DRAPE,53X77,STERILE: Brand: MEDLINE

## (undated) DEVICE — DISPOSABLE TOURNIQUET CUFF SINGLE BLADDER, DUAL PORT AND QUICK CONNECT CONNECTOR: Brand: COLOR CUFF

## (undated) DEVICE — PACK CDS TOTAL KNEE

## (undated) DEVICE — SCREW HEADFRAME FEM HEX 2.5MM

## (undated) DEVICE — PIN FIXATION 3.2X150MM FLUTED

## (undated) DEVICE — APPLICATOR PREP 26ML CHG 2% ISO ALC 70%

## (undated) DEVICE — PAD PERINL PST FOAM DISP FOR AMSCO SURG TBL

## (undated) DEVICE — GAMMEX® PI HYBRID SIZE 7, STERILE POWDER-FREE SURGICAL GLOVE, POLYISOPRENE AND NEOPRENE BLEND: Brand: GAMMEX

## (undated) DEVICE — DRESSING FOAM 4X10IN POLYUR SAFETAC

## (undated) DEVICE — TOWEL SURG OR 17X30IN BLUE

## (undated) DEVICE — DRIVER SUR HEX FOR FEM SET RH KNEE PRI/REV

## (undated) DEVICE — SUT VCRL 2-0 27IN FS-1 ABSRB UD L24MM 3/8 CIR

## (undated) DEVICE — PIN DRL 75MM HDLSS TRCR NXGN

## (undated) DEVICE — DRAPE ROSA ROBOTIC UN

## (undated) DEVICE — PADDING 4YDX6IN CTTN STRL WBRL

## (undated) DEVICE — GAUZE SPONGES,12 PLY: Brand: CURITY

## (undated) DEVICE — SOLUTION IRRIG 1000ML 0.9% NACL USP BTL

## (undated) DEVICE — GAUZE,SPONGE,4"X4",12PLY,WOVEN,NS,LF: Brand: MEDLINE

## (undated) DEVICE — BLADE RMR 46MM PAT SS W/ PILOT H

## (undated) DEVICE — SCRW BONE PERSONA 3.5 MMXL48MM

## (undated) DEVICE — SUT VICRYL 2-0 FS-1 J443H

## (undated) DEVICE — ETHBND 1 30IN OS8 GRN

## (undated) DEVICE — TOTAL KNEE: Brand: MEDLINE INDUSTRIES, INC.

## (undated) DEVICE — BNDG COHESIVE W4INXL5YD TAN E

## (undated) DEVICE — SOL NACL IRRIG 0.9% 1000ML BTL

## (undated) DEVICE — SOLUTION IRRIG 3000ML 0.9% NACL FLX CONT

## (undated) DEVICE — BANDAGE COHESIVE 4INX5YD TAN E POR SLF ADH

## (undated) NOTE — LETTER
Date & Time: 10/5/2022, 10:18 AM  Patient: Margarito Iron  Encounter Provider(s):    Reagan Melo MD       To Whom It May Concern:    Margret Lucio was seen and treated in our department on 10/5/2022. She {Return to school/sport/work:[id number]}.     If you have any questions or concerns, please do not hesitate to call.        _____________________________  Physician/APC Signature

## (undated) NOTE — LETTER
CONTINUOUS GLUCOSE MONITOR AND INSULIN PUMP AGREEMENT     CONTINUOUS GLUCOSE MONITOR (CGM) (If not signed, not applicable)     CGMs are not currently approved by the FDA for use in the hospital. If you choose to continue to wear your CGM in the hospital, we ask that you agree to the following:     ?   Allow finger stick blood glucose tests to be performed using hospital glucose monitor.   ?   Insulin dosing and hypoglycemia (low blood sugar) treatment will be provided based on hospital glucose monitor        results.   ?   Remove your CGM on or before the date it is due to be removed or as ordered by physician.   ?   Remove your CGM prior to any scheduled surgery and as instructed for procedures.   ?   Remove your CGM prior to Magnetic Resonance Imaging (MRI), Computerized Tomography (CT) or x-ray as it can      damage your CGM.   ?   Inform staff of any skin irritation, redness, or other problems with your skin or CGM.     __________________________________________________________ ________________________________   Patient/Guardian Signature                                                                                  Date/Time     __________________________________________________________   Print Guardian Name     __________________________________________________________ ________________________________   Staff/RN Signature                                                                                                 Date/Time      INSULIN PUMP (If not signed, not applicable)     For your safety and best possible care, we ask that you agree to the following. If you cannot agree to the following requirements of this agreement, or, if it is not possible for you to meet these requirements, we will provide and administer insulin based on your provider’s orders.     ?   Communicate your site changes, carbohydrate intake, and all boluses to your nurse.   ?   Provide your own insulin and pump supplies.   ?   Change  your infusion site at least every 3 days, and as needed for skin irritation or two consecutive glucose readings       greater than 240 mg/dL.   ?   Make no changes to your basal rates, carb ratios, or correction factor unless directed to do so by the physician        managing your insulin pump.   ?   Notify your nurse before you eat so that your blood glucose level can be obtained with hospital glucose monitor.   ?   Report symptoms of low blood sugar to your nurse immediately.   ?   Notify your nurse of any problems with your pump that you cannot correct.     I understand that my insulin pump may be discontinued and a different method of insulin delivery will be provided for any of the following reasons:     ?   Inability to safely perform diabetes self-management activities because of the condition for which I was hospitalized,        or side effects of my treatment.   ? Inability or inconsistency in performing the diabetes self-management activities described above.     __________________________________________________________ ________________________________   Patient/Guardian Signature                                                                                 Date/Time     __________________________________________________________   Print Guardian Name     __________________________________________________________ ________________________________   Staff/RN Signature                                                                                                 Date/Time       Patient Name: Karis Joya     : 1950                 Printed: 2024     Medical Record #: [id number]                                            Page         INSULIN PUMP NURSING CHECKLIST   ON ADMISSION     ? Document insulin pump in Medical Devices/Implants section of Epic (even if patient not wearing pump in hospital).   ? Use link in Insulin Pump BPA to print the CGM/Insulin Pump Agreement and Checklists.     ? Apply patient label to Agreement and have patient read and sign it. Place on chart.   ? Add Insulin Pump LDA to the IV Assessment flowsheet.   ? Endocrinology to be consulted (except at [facility]):    ? Ancelmo: Attending to initiate consult.    ?  Gabriele: Open endocrinologist order through BPA (no cosign required). Page on-call endocrinologist          through [employer].    ?  Willy Montoya: Notify attending of insulin pump.     EVERY SHIFT     ? Document pump site assessment and any site changes. ADMISSION   ? Chart ALL insulin bolus doses in MAR as “Self-administered via pump”.     PROTOCOL REMINDERS     ? Insulin Pump Focused order set is to be used for all patients using an insulin pump.   ? Patient to provide own pump supplies and insulin.   ? Point of care glucose to be performed using hospital glucometer, even if wearing a continuous glucose           monitor.     NOTIFY ENDOCRINOLOGY OR MFM  FOR: (at [facility], notify attending)     Temporary discontinuation of pump infusion for more than one hour   Surgery   Change in patient condition, mental status, or compliance that prohibits ability to self-manage the insulin pump   Patient reports insulin pump not operating properly   Patient does not have appropriate insulin or supplies for insulin pump   Risk for suicide   Blood glucose outside ranges indicated in insulin pump orders       DO NOT REMOVE INSULIN PUMP WITHOUT ORDER FROM ENDOCRINOLOGIST/MGM -  or attending at [facility]     NOT PART OF PERMANENT CHART     Patient Name: Karis Joya     : 1950                 Printed: 2024     Medical Record #: [id number]                                            Page  FOR: (at [facility],     CONTINUOUS GLUCOSE MONITOR (CGM) NURSING CHECKLIST     A Continuous Glucose Monitor (CGM), also referred to as a “sensor”, is a small device that takes frequent blood glucose (BG) readings, approximately every 5 minutes.   BG is measured in interstitial fluid by a  tiny electrode under the skin.   The CGM can be worn up to 10-14 days, depending on the model.   Often used along with an insulin pump, but may also be a stand-alone device.      ON ADMISSION     ?   Document CGM in Medical Devices/Implants in Admission Navigator  ?   When BPA fires, print the GM/Insulin Pump Agreement, place patient label, have patient read and sign and place on        chart.   ?   Add CGM LDA to the IV Assessment flowsheet   ?   Inform patient that HOSPITAL GLUCOSE MONITOR will be used for BG testing   ?   CGM not approved by FDA for inpatient use    ?   Frequent quality tests are performed on hospital glucose monitor                ?   Results of hospital glucose monitor cross over into electronic medical record   ?   Some medications interfere with CGM models including acetaminophen, aspirin, and Vitamin C    IMPORTANT INFORMATION     ? Point of care glucose to be performed using HOSPITAL glucose monitor.   ? Do not treat with insulin or treat hypoglycemia based on CGM values.   ? Patient to remove CGM before MRI, CT, x-ray, or any procedure that uses radiation such as ERCP,         fluoroscopic exam, IV Pyelogram, mammogram, cardiac cath, etc.   ? Exposure to above imaging may damage the CGM causing inaccurate BG readings, or prevent the         device from alarming.   ? Patient to remove CGM prior to surgery as it is often unknown if x-rays or other imaging will be used.   ? If CGM is removed or falls off, give to patient or family, or store according to hospital policy. Not all parts         are disposable and replacement can be very expensive.   ? Every shift, document CGM site assessment   ? Chart removal of CGM     NOTIFY ATTENDING/ENDOCRINOLOGIST IF:     ? Patient refuses to allow finger stick tests with hospital glucose monitor.   ? Any problems or irritation at CGM site.      NOT PART OF PERMANENT CHART  Patient Name: Karis Joya     : 1950                 Printed:   2024     Medical Record #: [id number]                                            Page 1/1 24/7